# Patient Record
Sex: FEMALE | Race: WHITE | NOT HISPANIC OR LATINO | Employment: OTHER | URBAN - METROPOLITAN AREA
[De-identification: names, ages, dates, MRNs, and addresses within clinical notes are randomized per-mention and may not be internally consistent; named-entity substitution may affect disease eponyms.]

---

## 2017-01-01 ENCOUNTER — HOME CARE/HOSPICE - HEALTHEAST (OUTPATIENT)
Dept: HOSPICE | Facility: HOSPICE | Age: 72
End: 2017-01-01

## 2017-01-01 ENCOUNTER — HOME CARE/HOSPICE - HEALTHEAST (OUTPATIENT)
Dept: HOME HEALTH SERVICES | Facility: HOME HEALTH | Age: 72
End: 2017-01-01

## 2017-01-01 ENCOUNTER — ANESTHESIA - HEALTHEAST (OUTPATIENT)
Dept: SURGERY | Facility: HOSPITAL | Age: 72
End: 2017-01-01

## 2017-01-01 ENCOUNTER — COMMUNICATION - HEALTHEAST (OUTPATIENT)
Dept: INTERNAL MEDICINE | Facility: CLINIC | Age: 72
End: 2017-01-01

## 2017-01-01 ENCOUNTER — AMBULATORY - HEALTHEAST (OUTPATIENT)
Dept: HOSPICE | Facility: HOSPICE | Age: 72
End: 2017-01-01

## 2017-01-01 ENCOUNTER — RECORDS - HEALTHEAST (OUTPATIENT)
Dept: ADMINISTRATIVE | Facility: OTHER | Age: 72
End: 2017-01-01

## 2017-01-01 ENCOUNTER — AMBULATORY - HEALTHEAST (OUTPATIENT)
Dept: LAB | Facility: CLINIC | Age: 72
End: 2017-01-01

## 2017-01-01 ENCOUNTER — OFFICE VISIT - HEALTHEAST (OUTPATIENT)
Dept: VASCULAR SURGERY | Facility: CLINIC | Age: 72
End: 2017-01-01

## 2017-01-01 ENCOUNTER — COMMUNICATION - HEALTHEAST (OUTPATIENT)
Dept: CARE COORDINATION | Facility: CLINIC | Age: 72
End: 2017-01-01

## 2017-01-01 ENCOUNTER — COMMUNICATION - HEALTHEAST (OUTPATIENT)
Dept: PHYSICAL THERAPY | Age: 72
End: 2017-01-01

## 2017-01-01 ENCOUNTER — SURGERY - HEALTHEAST (OUTPATIENT)
Dept: SURGERY | Facility: HOSPITAL | Age: 72
End: 2017-01-01

## 2017-01-01 ENCOUNTER — AMBULATORY - HEALTHEAST (OUTPATIENT)
Dept: INTERNAL MEDICINE | Facility: CLINIC | Age: 72
End: 2017-01-01

## 2017-01-01 ENCOUNTER — COMMUNICATION - HEALTHEAST (OUTPATIENT)
Dept: VASCULAR SURGERY | Facility: CLINIC | Age: 72
End: 2017-01-01

## 2017-01-01 ENCOUNTER — OFFICE VISIT - HEALTHEAST (OUTPATIENT)
Dept: INTERNAL MEDICINE | Facility: CLINIC | Age: 72
End: 2017-01-01

## 2017-01-01 ENCOUNTER — OFFICE VISIT - HEALTHEAST (OUTPATIENT)
Dept: PODIATRY | Facility: CLINIC | Age: 72
End: 2017-01-01

## 2017-01-01 ENCOUNTER — COMMUNICATION - HEALTHEAST (OUTPATIENT)
Dept: SCHEDULING | Facility: CLINIC | Age: 72
End: 2017-01-01

## 2017-01-01 ENCOUNTER — COMMUNICATION - HEALTHEAST (OUTPATIENT)
Dept: HOME HEALTH SERVICES | Facility: HOME HEALTH | Age: 72
End: 2017-01-01

## 2017-01-01 ENCOUNTER — AMBULATORY - HEALTHEAST (OUTPATIENT)
Dept: CARE COORDINATION | Facility: CLINIC | Age: 72
End: 2017-01-01

## 2017-01-01 ENCOUNTER — AMBULATORY - HEALTHEAST (OUTPATIENT)
Dept: NURSING | Facility: CLINIC | Age: 72
End: 2017-01-01

## 2017-01-01 ENCOUNTER — RECORDS - HEALTHEAST (OUTPATIENT)
Dept: MAMMOGRAPHY | Facility: CLINIC | Age: 72
End: 2017-01-01

## 2017-01-01 ENCOUNTER — COMMUNICATION - HEALTHEAST (OUTPATIENT)
Dept: TELEHEALTH | Facility: CLINIC | Age: 72
End: 2017-01-01

## 2017-01-01 ENCOUNTER — AMBULATORY - HEALTHEAST (OUTPATIENT)
Dept: FAMILY MEDICINE | Facility: CLINIC | Age: 72
End: 2017-01-01

## 2017-01-01 ENCOUNTER — HOSPITAL ENCOUNTER (OUTPATIENT)
Dept: ULTRASOUND IMAGING | Facility: CLINIC | Age: 72
Discharge: HOME | End: 2017-06-16
Attending: INTERNAL MEDICINE

## 2017-01-01 ENCOUNTER — OFFICE VISIT - HEALTHEAST (OUTPATIENT)
Dept: GERIATRICS | Facility: CLINIC | Age: 72
End: 2017-01-01

## 2017-01-01 ENCOUNTER — OFFICE VISIT - HEALTHEAST (OUTPATIENT)
Dept: NURSING | Facility: CLINIC | Age: 72
End: 2017-01-01

## 2017-01-01 ENCOUNTER — AMBULATORY - HEALTHEAST (OUTPATIENT)
Dept: VASCULAR SURGERY | Facility: CLINIC | Age: 72
End: 2017-01-01

## 2017-01-01 DIAGNOSIS — I67.9 CEREBROVASCULAR DISEASE: ICD-10-CM

## 2017-01-01 DIAGNOSIS — L89.153 DECUBITUS ULCER OF COCCYX, STAGE III (H): ICD-10-CM

## 2017-01-01 DIAGNOSIS — I42.9 CARDIOMYOPATHY (H): ICD-10-CM

## 2017-01-01 DIAGNOSIS — I69.359 HISTORY OF HEMORRHAGIC STROKE WITH RESIDUAL HEMIPARESIS (H): ICD-10-CM

## 2017-01-01 DIAGNOSIS — Z79.899 POLYPHARMACY: ICD-10-CM

## 2017-01-01 DIAGNOSIS — E03.9 HYPOTHYROIDISM: ICD-10-CM

## 2017-01-01 DIAGNOSIS — C25.9 PANCREATIC ADENOCARCINOMA (H): ICD-10-CM

## 2017-01-01 DIAGNOSIS — R60.9 EDEMA: ICD-10-CM

## 2017-01-01 DIAGNOSIS — J31.0 CHRONIC RHINITIS: ICD-10-CM

## 2017-01-01 DIAGNOSIS — I51.9 HEART DISEASE, UNSPECIFIED: ICD-10-CM

## 2017-01-01 DIAGNOSIS — K21.9 GASTROESOPHAGEAL REFLUX DISEASE, ESOPHAGITIS PRESENCE NOT SPECIFIED: ICD-10-CM

## 2017-01-01 DIAGNOSIS — R29.6 FALLS FREQUENTLY: ICD-10-CM

## 2017-01-01 DIAGNOSIS — F90.9 ADHD (ATTENTION DEFICIT HYPERACTIVITY DISORDER): ICD-10-CM

## 2017-01-01 DIAGNOSIS — G90.522 COMPLEX REGIONAL PAIN SYNDROME TYPE 1 OF LEFT LOWER EXTREMITY: ICD-10-CM

## 2017-01-01 DIAGNOSIS — E43 SEVERE PROTEIN-CALORIE MALNUTRITION (H): ICD-10-CM

## 2017-01-01 DIAGNOSIS — N39.0 UTI (URINARY TRACT INFECTION): ICD-10-CM

## 2017-01-01 DIAGNOSIS — L97.509 FOOT ULCER (H): ICD-10-CM

## 2017-01-01 DIAGNOSIS — I42.8 NONISCHEMIC CARDIOMYOPATHY (H): ICD-10-CM

## 2017-01-01 DIAGNOSIS — R21 RASH: ICD-10-CM

## 2017-01-01 DIAGNOSIS — M06.09 RHEUMATOID ARTHRITIS OF MULTIPLE SITES WITH NEGATIVE RHEUMATOID FACTOR (H): ICD-10-CM

## 2017-01-01 DIAGNOSIS — G89.4 CHRONIC PAIN SYNDROME: ICD-10-CM

## 2017-01-01 DIAGNOSIS — R41.3 MEMORY LOSS: ICD-10-CM

## 2017-01-01 DIAGNOSIS — Z01.818 PRE-OPERATIVE EXAMINATION FOR INTERNAL MEDICINE: ICD-10-CM

## 2017-01-01 DIAGNOSIS — Z12.11 COLON CANCER SCREENING: ICD-10-CM

## 2017-01-01 DIAGNOSIS — Z88.9 MULTIPLE ALLERGIES: ICD-10-CM

## 2017-01-01 DIAGNOSIS — R29.6 RECURRENT FALLS WHILE WALKING: ICD-10-CM

## 2017-01-01 DIAGNOSIS — L89.152 DECUBITUS ULCER OF COCCYX, STAGE II (H): ICD-10-CM

## 2017-01-01 DIAGNOSIS — N32.81 OAB (OVERACTIVE BLADDER): ICD-10-CM

## 2017-01-01 DIAGNOSIS — E53.8 VITAMIN B 12 DEFICIENCY: ICD-10-CM

## 2017-01-01 DIAGNOSIS — F32.A ANXIETY AND DEPRESSION: ICD-10-CM

## 2017-01-01 DIAGNOSIS — K86.89 PANCREATIC DUCT DILATED: ICD-10-CM

## 2017-01-01 DIAGNOSIS — M32.9 SYSTEMIC LUPUS ERYTHEMATOSUS (H): ICD-10-CM

## 2017-01-01 DIAGNOSIS — K21.9 GERD (GASTROESOPHAGEAL REFLUX DISEASE): ICD-10-CM

## 2017-01-01 DIAGNOSIS — M20.41 HAMMER TOE OF RIGHT FOOT: ICD-10-CM

## 2017-01-01 DIAGNOSIS — F32.A DEPRESSION, UNSPECIFIED DEPRESSION TYPE: ICD-10-CM

## 2017-01-01 DIAGNOSIS — R35.0 FREQUENCY OF URINATION: ICD-10-CM

## 2017-01-01 DIAGNOSIS — M47.817 LUMBOSACRAL SPONDYLOSIS WITHOUT MYELOPATHY: ICD-10-CM

## 2017-01-01 DIAGNOSIS — G25.81 RESTLESS LEGS: ICD-10-CM

## 2017-01-01 DIAGNOSIS — R10.11 RUQ PAIN: ICD-10-CM

## 2017-01-01 DIAGNOSIS — F41.9 ANXIETY AND DEPRESSION: ICD-10-CM

## 2017-01-01 DIAGNOSIS — Z12.11 SCREENING FOR COLON CANCER: ICD-10-CM

## 2017-01-01 DIAGNOSIS — G25.81 RLS (RESTLESS LEGS SYNDROME): ICD-10-CM

## 2017-01-01 DIAGNOSIS — M20.10 HALLUX VALGUS, UNSPECIFIED LATERALITY: ICD-10-CM

## 2017-01-01 DIAGNOSIS — L89.90 DECUBITUS ULCER, UNSPECIFIED PRESSURE ULCER STAGE: ICD-10-CM

## 2017-01-01 DIAGNOSIS — L89.152 SACRAL DECUBITUS ULCER, STAGE II (H): ICD-10-CM

## 2017-01-01 DIAGNOSIS — R52 PAIN MANAGEMENT: ICD-10-CM

## 2017-01-01 DIAGNOSIS — C25.9 PANCREATIC CANCER (H): ICD-10-CM

## 2017-01-01 DIAGNOSIS — G89.29 OTHER CHRONIC PAIN: ICD-10-CM

## 2017-01-01 DIAGNOSIS — R29.898 WEAKNESS OF HAND: ICD-10-CM

## 2017-01-01 DIAGNOSIS — K59.00 CONSTIPATION: ICD-10-CM

## 2017-01-01 DIAGNOSIS — I10 ESSENTIAL HYPERTENSION WITH GOAL BLOOD PRESSURE LESS THAN 140/90: ICD-10-CM

## 2017-01-01 DIAGNOSIS — M81.0 OSTEOPOROSIS: ICD-10-CM

## 2017-01-01 DIAGNOSIS — Z12.31 ENCOUNTER FOR SCREENING MAMMOGRAM FOR MALIGNANT NEOPLASM OF BREAST: ICD-10-CM

## 2017-01-01 DIAGNOSIS — E43 EDEMA DUE TO MALNUTRITION (H): ICD-10-CM

## 2017-01-01 LAB
ATRIAL RATE - MUSE: 90 BPM
DIASTOLIC BLOOD PRESSURE - MUSE: NORMAL MMHG
INTERPRETATION ECG - MUSE: NORMAL
P AXIS - MUSE: 65 DEGREES
PR INTERVAL - MUSE: 126 MS
QRS DURATION - MUSE: 92 MS
QT - MUSE: 380 MS
QTC - MUSE: 464 MS
R AXIS - MUSE: 15 DEGREES
SYSTOLIC BLOOD PRESSURE - MUSE: NORMAL MMHG
T AXIS - MUSE: 58 DEGREES
VENTRICULAR RATE- MUSE: 90 BPM

## 2017-01-01 ASSESSMENT — MIFFLIN-ST. JEOR
SCORE: 771.24
SCORE: 800.73
SCORE: 809.8
SCORE: 823.41

## 2017-10-30 ENCOUNTER — AMBULATORY - HEALTHEAST (OUTPATIENT)
Dept: GERIATRICS | Facility: CLINIC | Age: 72
End: 2017-10-30

## 2021-05-24 ENCOUNTER — RECORDS - HEALTHEAST (OUTPATIENT)
Dept: ADMINISTRATIVE | Facility: CLINIC | Age: 76
End: 2021-05-24

## 2021-05-25 ENCOUNTER — RECORDS - HEALTHEAST (OUTPATIENT)
Dept: ADMINISTRATIVE | Facility: CLINIC | Age: 76
End: 2021-05-25

## 2021-05-26 ENCOUNTER — RECORDS - HEALTHEAST (OUTPATIENT)
Dept: ADMINISTRATIVE | Facility: CLINIC | Age: 76
End: 2021-05-26

## 2021-05-27 ENCOUNTER — RECORDS - HEALTHEAST (OUTPATIENT)
Dept: ADMINISTRATIVE | Facility: CLINIC | Age: 76
End: 2021-05-27

## 2021-05-28 ENCOUNTER — RECORDS - HEALTHEAST (OUTPATIENT)
Dept: ADMINISTRATIVE | Facility: CLINIC | Age: 76
End: 2021-05-28

## 2021-05-29 ENCOUNTER — RECORDS - HEALTHEAST (OUTPATIENT)
Dept: ADMINISTRATIVE | Facility: CLINIC | Age: 76
End: 2021-05-29

## 2021-05-30 VITALS — WEIGHT: 102 LBS | BODY MASS INDEX: 23.71 KG/M2

## 2021-05-30 VITALS — BODY MASS INDEX: 24.94 KG/M2 | WEIGHT: 107.3 LBS

## 2021-05-30 VITALS — BODY MASS INDEX: 23.61 KG/M2 | WEIGHT: 102 LBS | HEIGHT: 55 IN

## 2021-05-30 VITALS — BODY MASS INDEX: 24.03 KG/M2 | WEIGHT: 103.4 LBS

## 2021-05-30 VITALS — WEIGHT: 105 LBS | BODY MASS INDEX: 24.3 KG/M2 | HEIGHT: 55 IN

## 2021-05-31 VITALS — HEIGHT: 55 IN | BODY MASS INDEX: 23.14 KG/M2 | WEIGHT: 100 LBS

## 2021-05-31 VITALS — HEIGHT: 55 IN | BODY MASS INDEX: 22.45 KG/M2 | WEIGHT: 97 LBS

## 2021-05-31 VITALS — WEIGHT: 100 LBS | BODY MASS INDEX: 24.11 KG/M2

## 2021-05-31 VITALS — WEIGHT: 99.5 LBS | BODY MASS INDEX: 23.13 KG/M2

## 2021-05-31 VITALS — BODY MASS INDEX: 24.09 KG/M2 | WEIGHT: 99.9 LBS

## 2021-05-31 VITALS — BODY MASS INDEX: 22.78 KG/M2 | WEIGHT: 98 LBS

## 2021-05-31 VITALS — WEIGHT: 100.3 LBS | BODY MASS INDEX: 23.31 KG/M2

## 2021-05-31 VITALS — BODY MASS INDEX: 23.39 KG/M2 | WEIGHT: 97 LBS

## 2021-06-04 ENCOUNTER — RECORDS - HEALTHEAST (OUTPATIENT)
Dept: ADMINISTRATIVE | Facility: CLINIC | Age: 76
End: 2021-06-04

## 2021-06-08 NOTE — PROGRESS NOTES
Plastic Surgery Assessment Note    Today's Visit:  Pt is doing well and has no concerns or complaints.  Pt is tolerating the dressings well.  Pt has no fever or chills.      Labs:  No results for input(s): CRP, HGBA1C, LABPRE, ALBUMIN in the last 72 hours.    Invalid input(s): CDIFF, HEM    Vitals:  Vitals:    01/31/17 1435   BP: 118/58   Pulse: 80   Resp: 16   Temp: 98.6  F (37  C)       Francisco:           There is no height or weight on file to calculate BMI.    Output:                       Physical Exam:  General Appearance:  Appears comfortable, Alert, cooperative, no distress,   Head: Normocephalic, without obvious abnormality, atraumatic  Eyes: PERRL, conjunctiva/corneas clear, EOM's intact, both eyes   Nose: Nares normal, no drainage   Throat: Lips, mucosa, and tongue normal; teeth and gums normal  Neck: Supple, symmetrical, trachea midline, no adenopathy;    Lungs: Clear to auscultation bilaterally, respirations unlabored  Chest Wall: No tenderness or deformity  Heart: Regular rate and rhythm, S1 and S2 normal, no murmur, rubs or gallop  Abdomen: Soft, non-tender, bowel sounds active all four quadrants,   no masses, no organomegaly  Extremities: Extremities normal, atraumatic, no cyanosis or edema  Pulses: DP pulses are 1-2+ bilat.    Skin: no rashes or lesions  Neurologic: normal and equal strength bilat in upper and lower extremities    Wound Ostomy Assessment/Plan:  Wound 11/22/16 Coccyx (Active)   Pre Size Length 0.5 1/31/2017  2:00 PM   Pre Size Width 1 1/31/2017  2:00 PM   Pre Total Sq cm 0.5 1/31/2017  2:00 PM         There are stage 2-3 coccyx pressure ulcer. The right and left lateral edges of the buttock pressure ulcers have epithelized and healed. The coccyx still has some necrotic sq at the center of the base of the ulcer. They are very tender to palpation but there is no erythema, drainage or pus. The dressings were all removed.  New dresssings were reapplied.   I recommend sacral foam  dressings. This should continue to heal secondarily. I discussed with her the importance of offloading and good nutrition. Follow up prn.      Boogie Cedeño MD

## 2021-06-08 NOTE — PROGRESS NOTES
Plastic Surgery Assessment Note    Today's Visit:  Pt is doing well and has no concerns or complaints.  Pt is tolerating the dressings well.  Pt has no fever or chills.      Labs:  No results for input(s): CRP, HGBA1C, LABPRE, ALBUMIN in the last 72 hours.    Invalid input(s): CDIFF, HEM    Vitals:  Vitals:    01/03/17 1415   BP: 124/70   Pulse: 80   Resp: 16   Temp: 98.1  F (36.7  C)       Francisco:           There is no height or weight on file to calculate BMI.    Output:                       Physical Exam:  General Appearance:  Appears comfortable, Alert, cooperative, no distress,   Head: Normocephalic, without obvious abnormality, atraumatic  Eyes: PERRL, conjunctiva/corneas clear, EOM's intact, both eyes   Nose: Nares normal, no drainage   Throat: Lips, mucosa, and tongue normal; teeth and gums normal  Neck: Supple, symmetrical, trachea midline, no adenopathy;    Lungs: Clear to auscultation bilaterally, respirations unlabored  Chest Wall: No tenderness or deformity  Heart: Regular rate and rhythm, S1 and S2 normal, no murmur, rubs or gallop  Abdomen: Soft, non-tender, bowel sounds active all four quadrants,   no masses, no organomegaly  Extremities: Extremities normal, atraumatic, no cyanosis or edema  Pulses: DP pulses are 1-2+ bilat.    Skin: no rashes or lesions  Neurologic: normal and equal strength bilat in upper and lower extremities    Wound Ostomy Assessment/Plan:  Wound 11/22/16 Coccyx (Active)   Pre Size Length 0.5 1/3/2017  2:00 PM   Pre Size Width 0.5 1/3/2017  2:00 PM   Pre Total Sq cm 0.25 1/3/2017  2:00 PM       Incision 10/20/15 Arm Right (Active)     There are stage 2-3 coccyx pressure ulcer.  The right and left lateral edges of the buttock pressure ulcers have epithelized and healed. The coccyx still has some necrotic sq at the center of the base of the ulcer. They are very tender to palpation but there is no erythema, drainage or pus. The dressings were all removed. Using a currette,  incisional debridement of the coccyx ulcer was done. Debrided 0.5x0.5 cm of necrotic sq to healthy bleeding tissue. Hemostasis was well obtained. She tolerated it well and no complications were noted. Dresssings were reapplied.   I recommend sacral foam dressings. This should continue to heal secondarily. I discussed with her the importance of offloading and good nutrition. Follow up 1 month.  Boogie Cedeño MD

## 2021-06-09 NOTE — PROGRESS NOTES
Documentation only encounter to record results of depression screening.  Depression screening completed via [employer] (formerly, [employer]).  See flow sheet for screening.

## 2021-06-09 NOTE — PROGRESS NOTES
1. Frequency of urination  2. UTI (urinary tract infection)  Michelle Moya is a 71-year-old woman with very complicated medical history including severe osteoporosis resulting in multiple compression fractures and chronic pain related to this, who recently had urinary tract infection diagnosed at [facility] [place] [facility] and treated for strep pneumonia UTI with Augmentin (after Bactrim was discontinued with the culture result).  Repeat UA shows some pyuria, but is contaminated with squamous cells.  Await culture.  - Urinalysis-UC if Indicated  - Culture, Urine    3. Chronic rhinitis  Chronic runny nose, which is very embarrassing for her.  Sometimes she does not feel the mucus running from her nose.  At the last visit, we decided to restart the fluticasone, but apparently this has not been helpful.  Unclear whether or not this is allergic rhinitis.  I think her sore throat and feeling unwell may be related.  I would not rule out the possibility of a sinus infection earlier in the month given his temperature of 100.0, purulent sinus discharge, both of which have resolved.  - Ambulatory referral to Allergy    4. Weakness of hand  She notes that sometime in November she thinks she had a stroke.  She notices that her left hand has decreased sensation, but on exam it is weaker.  She has history of carpal tunnel syndrome, and unclear if this was a definite new change.  I would suspect it was.  She does not have a neurologist, and did not seek treatment because of her history of hemorrhagic stroke and being told that she could never get TPA.  She also noted some trouble with her memory, swallowing.  I think it is important for her to be evaluated by neurology.  - Ambulatory referral to Neurology    5. Constipation  She is on too many medications at this time, but when we went through each 1, either another doctor is prescribing this, or it seems to have some benefit for her.  Medications I would consider discontinuing would be  sucralfate, trazodone in the morning (this was removed), dicyclomine, hydroxychloroquine (she has diagnoses of rheumatoid arthritis and lupus, but negative rheumatoid factor, anti-CCP, and negative EMMA).  Question what usefulness this medication is having.  She no longer sees rheumatology.  Risk may outweigh benefit here.    6. Chronic Pain  No changes    7. Systemic lupus erythematosus  See problem #5    8. Rheumatoid arthritis of multiple sites with negative rheumatoid factor  See problem #5    9. Sacral decubitus ulcer, stage II  She still goes to wound clinic.  Stage II sacral ulcer present without signs of infection.  She has visiting nurse caring for this.

## 2021-06-09 NOTE — PROGRESS NOTES
Medication Therapy Management Initial Visit     ASSESSMENT AND PLAN  1. Multiple allergies  Was recently referred to allergy.    2. Rash  Resolved.     3. Gastroesophageal reflux disease, esophagitis presence not specified  Stable per patient report, only uses Bentyl once daily, this was updated in her medication list.     4. Depression, unspecified depression type  Stable per patient report, but she continues to feel that she would benefit from more Ritalin with regards to being able to accomplish tasks better.  She follows with Dr. Crain who continues to titrate her doses down.  I believe that she may benefit from adding therapy to her current mental health regimen, discussed that Ravi is here in clinic who may be able to help her work through some of her concerns.  I'm curious if there is underlying reason why she is against discontinuing any of her medications to help simplify her regimen.  -Recommend referral for therapy at [facility]    5. Heart Disease  Was most recently seen by Dr. Chaudhry in November 2016, due to history of nonischemic cardiomyopathy.  He attempted to discontinue her furosemide and potassium supplements, however I see that she is back on these and feels that she gains 23 pounds of water weight when these are discontinued.  Discussed with her nurse, who persists in her pillbox daily, and if she is going somewhere Michelle will take them out of her pillbox.  Provided education that she should take both potassium and loop diuretic together.  Labs were stable in December 2016.  Again these are additional medications that she is unwilling to part with, despite recommendations by cardiology.  Her blood pressure generally runs low, recommend one-month trial of discontinuing furosemide and potassium and assess for fluid concerns.  -Consider discussing trial of one month off of furosemide and potassium    6. Systemic lupus erythematosus  Per Dr. Lerma's assessment, she has a  previous diagnosis of rheumatoid arthritis and lupus however has several negative lab assessments which putting crushed in her current treatments.  She however is very resistant to discussing adjusting the use of hydroxychloroquine or dicyclomine.  She has been on hydroxychloroquine for many years, requires regular follow-up and with the eye doctor to assess safety of her medication therefore risk may outweigh benefit.  She is not willing to discuss these further today.    7. Lumbar Spondylosis  Follows with [facility] pain clinic, stable at this time, recommend to continue following up there as they have been monitoring her morphine use.    FOLLOW-UP PLAN  Michelle was advised to follow up with Dr. Lerma as previously directed.    The following instructions were given:   Patient Instructions   For now restart furosemide + potassium every other day until she is feeling better then resume at normal daily dosing     Find out if you should be on hydroxyzine only as needed for itching or if this is also used for anxiety     Refill Multivitamin at pharmacy - or purchase over the counter if not covered     A few medication changes were updated on list.     Call Mell with questions or concerns: [phone]       SUBJECTIVE AND OBJECTIVE  Michelle Moya is a 71 y.o. female who was referred by Eusebio Lerma DO for MT services.  Michelle's chief concern today is medication management. This visit was largely medication reconciliation as her home care nurse and PCP had brought up concerns for polypharmacy. There are several occasions throughout our visit where she does not know what her medications are yet we were able to figure them out with further questioning. She is somewhat attached to certain medications, making it difficult to simplify her regimen.       Allergies: seeing allergist.     Dermatology: rash that has resolved, prn benadryl cream.     Bowel regimen: dicyclomine helps keep stools soft to get them to go through  "her colostomy. Feels that her regimen of prune juice and miralax every other day.     Depression: Dr. Crain every 3 months. Used to do therapy, but her person retired. Consider Ravi? Feels scattered, having a hard time finishing things. Not sleeping well. Was in a study and started on ritalin. This had been very effective in helping her accomplish tasks. Dr. Crain has been decreasing her dose over time.    Cards: had a UTI and was urinating too much, now that this has resolved she is wondering how much furosemide she needs. If she doesn't take this she will \"blow up.\" she gained 23lbs in 3 weeks after this was discontinud per cardiology on 11/18/2016.     Rheumatology: taking hydroxychloroquine, pressures are on the fence this is watched every few months. She was diagnosed by Dr. Yoshi Chacon about 15-18 years ago. Has been on hydroxychloroquine since she will not be on prednisone, etc. Has been on hydroxychloroquine for over 5 years.      [facility] pain clinic: morphine pump plus BID morphine sulfate. For headaches uses APAP 3-4 times per week.    Adherence: Michelle misses 0 doses of medications per week. She does use a pill box at home, filled by her nurse.    This note has been dictated using voice recognition software. Any grammatical or context distortions are unintentional and inherent to the software.    Michelle was provided with follow up instructions, and this care plan was communicated via EMR with her primary care provider, Eusebio Lerma DO, and is the authorizing prescriber for this visit.  Direct supervision was available by either the patient's PCP or another available physician when needed.    Time spent: 45 minutes  Level of service: 3    Leo Youssef, PharmD, BCACP  Medication Management (MTM) Pharmacist  [facility]    We reviewed Michelle's medication list with them, discussing reason for use, directions for use, and potential side effects of each medication.  Indication, safety, " efficacy, and convenience was assessed for all reviewed medications.  No environmental factors were noted currently affecting patient.    Current Outpatient Prescriptions   Medication Sig Dispense Refill     acetaminophen (TYLENOL) 500 MG tablet Take 500-1,000 mg by mouth as needed. For headaches        albuterol (PROVENTIL HFA;VENTOLIN HFA) 90 mcg/actuation inhaler Inhale 2 puffs every 4 (four) hours as needed for wheezing.       aspirin 325 MG tablet Take 325 mg by mouth daily.       atorvastatin (LIPITOR) 10 MG tablet 1 TAB BY MOUTH DAILY (CHOLESTEROL) 30 tablet 11     calcium citrate (CALCITRATE) 200 mg (950 mg) tablet Take 2.5 tablets (500 mg total) by mouth 3 (three) times a day. 675 tablet 1     carvedilol (COREG) 12.5 MG tablet Take 1 tablet (12.5 mg total) by mouth 2 (two) times a day with meals. 180 tablet 4     cetirizine (ZYRTEC) 10 MG tablet Take 10 mg by mouth every morning.       cholecalciferol, vitamin D3, 1,000 unit tablet Take 3,000 Units by mouth daily.       clonazePAM (KLONOPIN) 0.5 MG tablet Take 0.25 mg by mouth 2 (two) times a day. Every morning and lunch        clonazePAM (KLONOPIN) 0.5 MG tablet Take 0.5 mg by mouth bedtime.        conjugated estrogens (PREMARIN) vaginal cream Apply pea size amount on finger and apply to vaginal area 1-2x week. 42.5 g 11     cyanocobalamin (VITAMIN B-12) 1000 MCG tablet Take 1,000 mcg by mouth daily.       cycloSPORINE (RESTASIS) 0.05 % ophthalmic emulsion Administer 1 drop to both eyes 2 (two) times a day.       dicyclomine (BENTYL) 10 MG capsule Take 1 capsule (10 mg total) by mouth every morning. And two other doses as needed daily 90 capsule 3     EPINEPHrine (EPIPEN) 0.3 mg/0.3 mL (1:1,000) atIn Inject 0.3 mg into the shoulder, thigh, or buttocks once.       estradiol (ESTRACE) 0.01 % (0.1 mg/gram) vaginal cream Place pea size amount to finger and apply to vaginal area 1-2x per week 42.5 g 0     ferrous sulfate 325 mg (65 mg iron) CpER Take 1 tablet  "by mouth every morning.        FETZIMA 120 mg Cs24 ER capsule 1 CAPSULE BY MOUTH DAILY (ANTI-DEPRESSANT) 30 capsule 11     fluticasone (FLONASE) 50 mcg/actuation nasal spray 2 sprays into each nostril daily. 16 g 1     foam bandage 3 X 3 \" Bndg Apply 1 application topically daily. 30 each 1     furosemide (LASIX) 20 MG tablet Take 1 tablet (20 mg total) by mouth daily. 90 tablet 3     hydrocolloid dressing (DUODERM CGF DRESSING) 4 X 4 \" Bndg Apply 1 patch topically 2 (two) times a week. 10 each 1     hydroxychloroquine (PLAQUENIL) 200 mg tablet TAKE 1 TABLET (200 MG TOTAL) BY MOUTH DAILY. (LUPUS) 30 tablet 10     hydrOXYzine (ATARAX) 25 MG tablet Take 25 mg by mouth 3 (three) times a day as needed for itching.       levothyroxine (SYNTHROID, LEVOTHROID) 75 MCG tablet 1 TAB BY MOUTH DAILY (DX:UNDERACTIVE THYROID) 30 tablet 10     lipase-protease-amylase (CREON) 12,000-38,000 -60,000 unit CpDR capsule Take 12,000 units of lipase by mouth 3 (three) times a day with meals.       lisinopril (PRINIVIL,ZESTRIL) 10 MG tablet Take 1 tablet (10 mg total) by mouth daily. 90 tablet 4     methylphenidate (RITALIN) 5 MG tablet Take 15 mg by mouth every morning. And 10mg at noon       morphine (MS CONTIN) 15 MG 12 hr tablet Take 1 tablet (15 mg total) by mouth every 12 (twelve) hours. (Patient taking differently: Take 15 mg by mouth every 6 (six) hours. ) 720 tablet 0     mupirocin (BACTROBAN) 2 % ointment Apply topically 3 (three) times a day.       nitroglycerin (NITROSTAT) 0.4 MG SL tablet Place 0.4 mg under the tongue every 5 (five) minutes as needed for chest pain.       omeprazole (PRILOSEC) 20 MG capsule 1 CAP BY MOUTH TWICE DAILY - TAKE 30 MINUTES BEFORE A MEAL - DO NOT CRUSH (DX: GERD) 60 capsule 11     ondansetron (ZOFRAN) 4 MG tablet Take 4 mg by mouth every 8 (eight) hours as needed for nausea.       polyethylene glycol (MIRALAX) 17 gram packet Take 1 packet (17 g total) by mouth every other day. 17 GM in 8 oz of water " daily as needed 30 each 11     polyvinyl alcohol (ARTIFICIAL TEARS, POLYVIN ALC,) 1.4 % ophthalmic solution as needed.        potassium chloride SA (K-DUR,KLOR-CON) 20 MEQ tablet Take 1 tablet (20 mEq total) by mouth daily. 90 tablet 3     rOPINIRole (REQUIP) 0.25 MG tablet 1 TAB PO BID - IN MORNING AND BEDTIME (DX: RESTLESS LEGS) 60 tablet 11     senna (SENOKOT) 8.6 mg tablet Take 1 tablet by mouth 2 (two) times a day as needed for constipation. 1 tablet BID as needed for constipation       simethicone (GAS-X) 80 MG chewable tablet Chew 80 mg 3 (three) times a day.        sodium chloride (OCEAN) 0.65 % nasal spray 1 spray into each nostril as needed for congestion. Install 2 sprays into both nostrils every 4 hours as needed for congestion       sucralfate (CARAFATE) 1 gram tablet 1 TAB BY MOUTH FOUR TIMES DAILY (DX:REFLUX DISEASE) 120 tablet 10     TAB-A-HU per tablet 1 TAB BY MOUTH DAILY (DX: SUPPLEMENT) 100 tablet 3     topiramate (TOPAMAX) 100 MG tablet 1 TAB BY MOUTH TWICE DAILY (DX:MOOD DISORDER) 60 tablet 10     traZODone (DESYREL) 150 MG tablet Take 200 mg by mouth bedtime. Per Dr. Crain        triamcinolone (KENALOG) 0.1 % cream Apply topically 3 (three) times a day as needed. 80 g 1     trospium (SANCTURA) 20 mg tablet Take 20 mg by mouth 2 (two) times a day.       vitamin E 400 UNIT capsule Take 400 Units by mouth daily.       Current Facility-Administered Medications   Medication Dose Route Frequency Provider Last Rate Last Dose     denosumab 60 mg (PROLIA 60 mg/ml)  60 mg Subcutaneous Q6 Months Eusebio Lerma DO   60 mg at 02/15/17 1420     lidocaine 2 % jelly (XYLOCAINE)   Topical PRN Boogie Cedeño MD   1 application at 01/31/17 1440

## 2021-06-09 NOTE — PROGRESS NOTES
ASSESSMENT:  1. Frequency of urination  2. UTI (urinary tract infection)  Michelle Moya is a 71-year-old woman with very complicated medical history including severe osteoporosis resulting in multiple compression fractures and chronic pain related to this, who recently had urinary tract infection diagnosed at [facility] [place] [facility] and treated for strep pneumonia UTI with Augmentin (after Bactrim was discontinued with the culture result).  Repeat UA shows some pyuria, but is contaminated with squamous cells.  Await culture.  - Urinalysis-UC if Indicated  - Culture, Urine    3. Chronic rhinitis  Chronic runny nose, which is very embarrassing for her.  Sometimes she does not feel the mucus running from her nose.  At the last visit, we decided to restart the fluticasone, but apparently this has not been helpful.  Unclear whether or not this is allergic rhinitis.  I think her sore throat and feeling unwell may be related.  I would not rule out the possibility of a sinus infection earlier in the month given his temperature of 100.0, purulent sinus discharge, both of which have resolved.  - Ambulatory referral to Allergy    4. Weakness of hand  She notes that sometime in November she thinks she had a stroke.  She notices that her left hand has decreased sensation, but on exam it is weaker.  She has history of carpal tunnel syndrome, and unclear if this was a definite new change.  I would suspect it was.  She does not have a neurologist, and did not seek treatment because of her history of hemorrhagic stroke and being told that she could never get TPA.  She also noted some trouble with her memory, swallowing.  I think it is important for her to be evaluated by neurology.  - Ambulatory referral to Neurology    5. Constipation  She is on too many medications at this time, but when we went through each 1, either another doctor is prescribing this, or it seems to have some benefit for her.  Medications I would consider  discontinuing would be sucralfate, trazodone in the morning (this was removed), dicyclomine, hydroxychloroquine (she has diagnoses of rheumatoid arthritis and lupus, but negative rheumatoid factor, anti-CCP, and negative EMMA).  Question what usefulness this medication is having.  She no longer sees rheumatology.  Risk may outweigh benefit here.    6. Chronic Pain  No changes    7. Systemic lupus erythematosus  See problem #5    8. Rheumatoid arthritis of multiple sites with negative rheumatoid factor  See problem #5    9. Sacral decubitus ulcer, stage II  She still goes to wound clinic.  Stage II sacral ulcer present without signs of infection.  She has visiting nurse caring for this.         PLAN:  Patient Instructions   Referral to allergist for sinus issues and runny nose    Neurology referral       Orders Placed This Encounter   Procedures     Culture, Urine     Ambulatory referral to Allergy     Referral Priority:   Routine     Referral Type:   Consultation     Referral Reason:   Evaluation and Treatment     Referred to Provider:   Adonis Fiedls MD     Requested Specialty:   Allergy     Number of Visits Requested:   1     Ambulatory referral to Neurology     Referral Priority:   Routine     Referral Type:   Consultation     Referral Reason:   Evaluation and Treatment     Referred to Provider:   Sumanth Huffman MD     Requested Specialty:   Neurology     Number of Visits Requested:   1     Medications Discontinued During This Encounter   Medication Reason     nystatin (MYCOSTATIN) ointment      traZODone (DESYREL) 50 MG tablet      polyethylene glycol (MIRALAX) 17 gram packet Reorder       Return in about 1 month (around 3/20/2017).    CHIEF COMPLAINT:  Chief Complaint   Patient presents with     Urinary Tract Infection     Illness     Sick for 4 weeks       HISTORY OF PRESENT ILLNESS:  Michelle is a 71 y.o. female presenting to the clinic today for problems with multiple problems. She is currently being  treated for UTI but she is most bothered by symptoms of sore throat and a chest cold which is persisting for the past 5 weeks. The sore throat has been present longer than the chest cold. She has also had fevers which are now resolved. Her nasal mucus was yellow but now is clear and persistently draining.     History of Stroke: She feels she had some TIAs in the fall of last year. She did not go to the hospital because she doesn't feel anything can be done in the hospital. She has had two strokes in the past. Her symptoms included drooling and nasal dripping that she does not feel. She does not wish to leave the house due to the nasal drainage which she finds embarrassing. She is unable to hold on to things with her left hand. She doesn't feel the items and drops them. She also finds it difficult to lift her arms above her head in order to fix her hair. Her PCAs have gotten together and compiled a list of changes in her they have noticed since last fall. Unfortunately, she forgot to bring the list. Her speech is affected and she feels mental confusion. She reports numbness in her right arm and lower legs bilaterally. She is not currently seeing a neurologist. Her former neurologist is no longer covered by her insurance.     Urinary Issues: She is treated by Dr. Santos Ramirez at [facility] for problems of incontinence. She is currently taking Augmentin for UTI and possible kidney infection. She was having some vaginal bleeding but this is resolved. She was receiving acupuncture for her urinary problems which she felt was working well until her current UTI.     Constipation: She is taking Miralax every other day with prunes on the day in between. This regimen is working for her.     Hip Pain: She has been told she cannot have a hip replacement surgery due to her heart.     Sleep Issues: She reports poor sleeping patterns lately. She states that she cannot sleep for more than 2 hours at a time. She takes trazodone  at night and again in the morning, though it makes her tired.    REVIEW OF SYSTEMS:   She denies depression though she is very frustrated by the ongoing ambiguity of her living situation. She does not know if she will be able to stay in her apartment with the aid of a PCA or if she will be forced to move to a nursing home.   She is having difficulty with swallowing. She is making sure to chew her food thoroughly and wash it down with water if she is having trouble.    She requests an all over treatment for dry skin.   She would like to discontinue morphine.   She reports sores on her bottom being treated with sacral foam dressings. [facility].   All other systems are negative.    PFSH:  She does not wish to go to a nursing home and does not feel that she needs this yet.     TOBACCO USE:  History   Smoking Status     Never Smoker   Smokeless Tobacco     Not on file       VITALS:  Vitals:    02/20/17 1156   BP: 108/56   Pulse: 80   Temp: 98.3  F (36.8  C)   Weight: 107 lb 4.8 oz (48.7 kg)     Wt Readings from Last 3 Encounters:   02/20/17 107 lb 4.8 oz (48.7 kg)   12/12/16 113 lb 6.4 oz (51.4 kg)   11/22/16 114 lb (51.7 kg)     Body mass index is 24.94 kg/(m^2).    PHYSICAL EXAM:  General: Alert, talkative soft-spoken woman seen in a motorized wheelchair.   HEENT: Enlarged left submandibular gland.   Ears: Decreased hearing to finger rubbing. She notes she is not wearing her hearing aids.   Lungs: Clear. Respiratory effort normal.   Skin: Stage 2 ulcer in the gluteal cleft, presacral. No surrounding erythema. No discharge.   Neuro: Decreased sensation left cheek. Decreased sensation lower legs.     ADDITIONAL HISTORY SUMMARIZED (2): reviewed OP rehab note from [facility] 2/14/17. No mention from ILS worker about concerns.  DECISION TO OBTAIN EXTRA INFORMATION (1): SARKIS for Care Everywhere obtained.   RADIOLOGY TESTS (1): None.  LABS (1): Labs ordered and reviewed.   MEDICINE TESTS (1):  None.  INDEPENDENT REVIEW (2 each): None.       The visit lasted a total of 27 minutes face to face with the patient. Over 50% of the time was spent counseling and educating the patient about neurologic symptoms.    ILeo, am scribing for and in the presence of, Dr. Lerma.    I, Dr. Lerma, personally performed the services described in this documentation, as scribed by Leo Torrez in my presence, and it is both accurate and complete.    MEDICATIONS:  Current Outpatient Prescriptions   Medication Sig Dispense Refill     acetaminophen (TYLENOL) 500 MG tablet Take 1,000 mg by mouth as needed (headaches). For headaches.       albuterol (PROVENTIL HFA;VENTOLIN HFA) 90 mcg/actuation inhaler Inhale 2 puffs every 4 (four) hours as needed for wheezing.       aspirin 325 MG tablet Take 325 mg by mouth daily.       atorvastatin (LIPITOR) 10 MG tablet 1 TAB BY MOUTH DAILY (CHOLESTEROL) 30 tablet 11     calcium citrate (CALCITRATE) 200 mg (950 mg) tablet Take 2.5 tablets (500 mg total) by mouth 3 (three) times a day. 675 tablet 1     carvedilol (COREG) 12.5 MG tablet Take 1 tablet (12.5 mg total) by mouth 2 (two) times a day with meals. 180 tablet 4     cetirizine (ZYRTEC) 10 MG tablet Take 10 mg by mouth every morning.       cholecalciferol, vitamin D3, 1,000 unit tablet Take 3,000 Units by mouth daily.       clonazePAM (KLONOPIN) 0.5 MG tablet Take 0.25 mg by mouth 2 (two) times a day. Every morning and lunch       clonazePAM (KLONOPIN) 0.5 MG tablet Take 0.5 mg by mouth bedtime.        cyanocobalamin (VITAMIN B-12) 1000 MCG tablet Take 1,000 mcg by mouth daily.       cycloSPORINE (RESTASIS) 0.05 % ophthalmic emulsion Administer 1 drop to both eyes 2 (two) times a day.       denosumab (PROLIA) 60 mg/mL Syrg Inject 1 mL (60 mg total) under the skin every 6 (six) months. 1 mL 0     desoximetasone (TOPICORT) 0.25 % cream Apply topically 2 (two) times a day. As needed       dicyclomine (BENTYL) 10 MG capsule 1 CAP BY  "MOUTH THREE TIMES DAILY AS NEEDED 90 capsule 3     diphenhydrAMINE (BENADRYL) 2 % cream Apply 1 application topically 3 (three) times a day as needed for itching.       EPINEPHrine (EPIPEN) 0.3 mg/0.3 mL (1:1,000) atIn Inject 0.3 mg into the shoulder, thigh, or buttocks once.       estradiol (ESTRACE) 0.01 % (0.1 mg/gram) vaginal cream Place pea size amount to finger and apply to vaginal area 1-2x per week 42.5 g 0     ferrous sulfate 325 mg (65 mg iron) CpER Take 1 tablet by mouth every morning.        FETZIMA 120 mg Cs24 ER capsule 1 CAPSULE BY MOUTH DAILY (ANTI-DEPRESSANT) 30 capsule 11     fluticasone (FLONASE) 50 mcg/actuation nasal spray 2 sprays into each nostril daily. 16 g 1     foam bandage 3 X 3 \" Bndg Apply 1 application topically daily. 30 each 1     furosemide (LASIX) 20 MG tablet Take 1 tablet (20 mg total) by mouth daily. 90 tablet 3     hydrocolloid dressing (DUODERM CGF DRESSING) 4 X 4 \" Bndg Apply 1 patch topically 2 (two) times a week. 10 each 1     hydroxychloroquine (PLAQUENIL) 200 mg tablet TAKE 1 TABLET (200 MG TOTAL) BY MOUTH DAILY. (LUPUS) 30 tablet 10     hydrOXYzine (ATARAX) 25 MG tablet Take 25 mg by mouth 3 (three) times a day as needed for itching.       levothyroxine (SYNTHROID, LEVOTHROID) 75 MCG tablet 1 TAB BY MOUTH DAILY (DX:UNDERACTIVE THYROID) 30 tablet 10     lipase-protease-amylase (CREON) 12,000-38,000 -60,000 unit CpDR capsule Take 12,000 units of lipase by mouth 3 (three) times a day with meals.       lisinopril (PRINIVIL,ZESTRIL) 10 MG tablet Take 1 tablet (10 mg total) by mouth daily. 90 tablet 4     methylphenidate (RITALIN) 5 MG tablet Take 15 mg by mouth every morning. And 10mg at noon       morphine (MS CONTIN) 15 MG 12 hr tablet Take 1 tablet (15 mg total) by mouth every 12 (twelve) hours. (Patient taking differently: Take 15 mg by mouth every 6 (six) hours. ) 720 tablet 0     multivitamin therapeutic (THERAGRAN) tablet Take 1 tablet by mouth daily.       mupirocin " (BACTROBAN) 2 % ointment Apply topically 3 (three) times a day.       nitroglycerin (NITROSTAT) 0.4 MG SL tablet Place 0.4 mg under the tongue every 5 (five) minutes as needed for chest pain.       omeprazole (PRILOSEC) 20 MG capsule 1 CAP BY MOUTH TWICE DAILY - TAKE 30 MINUTES BEFORE A MEAL - DO NOT CRUSH (DX: GERD) 60 capsule 11     ondansetron (ZOFRAN) 4 MG tablet Take 4 mg by mouth every 8 (eight) hours as needed for nausea.       polyethylene glycol (MIRALAX) 17 gram packet Take 1 packet (17 g total) by mouth every other day. 17 GM in 8 oz of water daily as needed 30 each 11     polyvinyl alcohol (ARTIFICIAL TEARS, POLYVIN ALC,) 1.4 % ophthalmic solution as needed.        potassium chloride SA (K-DUR,KLOR-CON) 20 MEQ tablet Take 1 tablet (20 mEq total) by mouth daily. 90 tablet 3     rOPINIRole (REQUIP) 0.25 MG tablet 1 TAB PO BID - IN MORNING AND BEDTIME (DX: RESTLESS LEGS) 60 tablet 11     senna (SENOKOT) 8.6 mg tablet Take 1 tablet by mouth 2 (two) times a day as needed for constipation. 1 tablet BID as needed for constipation       simethicone (GAS-X) 80 MG chewable tablet Chew 80 mg 3 (three) times a day.        sodium chloride (OCEAN) 0.65 % nasal spray 1 spray into each nostril as needed for congestion. Install 2 sprays into both nostrils every 4 hours as needed for congestion       sucralfate (CARAFATE) 1 gram tablet 1 TAB BY MOUTH FOUR TIMES DAILY (DX:REFLUX DISEASE) 120 tablet 10     TAB-A-HU per tablet 1 TAB BY MOUTH DAILY (DX: SUPPLEMENT) 100 tablet 3     topiramate (TOPAMAX) 100 MG tablet 1 TAB BY MOUTH TWICE DAILY (DX:MOOD DISORDER) 60 tablet 10     traMADol (ULTRAM) 50 mg tablet Take 50 mg by mouth every 6 (six) hours as needed for pain.       traZODone (DESYREL) 150 MG tablet Take 150 mg by mouth bedtime.       triamcinolone (KENALOG) 0.1 % cream Apply topically 3 (three) times a day as needed. 80 g 1     trospium (SANCTURA) 20 mg tablet Take 20 mg by mouth 2 (two) times a day.       vitamin E  400 UNIT capsule Take 400 Units by mouth daily.       Current Facility-Administered Medications   Medication Dose Route Frequency Provider Last Rate Last Dose     denosumab 60 mg (PROLIA 60 mg/ml)  60 mg Subcutaneous Q6 Months Eusebio Lerma DO   60 mg at 02/15/17 1420     lidocaine 2 % jelly (XYLOCAINE)   Topical PRN Boogie Cedeño MD   1 application at 01/31/17 1440       Total data points: 4

## 2021-06-09 NOTE — PROGRESS NOTES
ASSESSMENT:  1. Cardiomyopathy  Michelle Moya is a 71-year-old woman with history of cardiomyopathy, best described as diastolic heart failure, rheumatoid arthritis, chronic pain secondary to osteoporosis, etc., who presents for follow-up.  Her weight had increased 116 pounds a couple months ago, has increased the Lasix to twice a day, which helped bring her weight down to the usual range of 103-106.  We will reduce Lasix to once a day, with instruction to take extra afternoon dose if weight is above 106.  She has trace edema on exam, clear lungs, well compensated at this time.  - furosemide (LASIX) 20 MG tablet; Take 20 mg by mouth daily, and take second afternoon dose if weight is over 106 lbs.  Dispense: 90 tablet; Refill: 3  - Basic Metabolic Panel    2. Restless legs  She has frequent leg jerking, and I talked about discontinuing ropinirole because it is such a low dose and may not be helpful.  Polypharmacy is a big problem, but medication changes are met with some resistance when we talk about them, such as ropinirole.  We will trial an increased dose of ropinirole to see if it helps, if it is not, titrate off.  She has multiple medications with overlapping functions.  I thought that may be since she is on MS Contin, the morphine would help with restless legs.  The same goes for clonazepam which she takes at night.  - rOPINIRole (REQUIP) 0.5 MG tablet; Take 1 tablet (0.5 mg total) by mouth 2 (two) times a day.  Dispense: 180 tablet; Refill: 3        PLAN:  Patient Instructions   STOP sucralfate    Lasix daily (instead of twice a day)-- take a second afternoon dose if the morning weight is above 106 lbs    INCREASE ropinirole to 0.5 mg twice a day       Orders Placed This Encounter   Procedures     Basic Metabolic Panel     Medications Discontinued During This Encounter   Medication Reason     sucralfate (CARAFATE) 1 gram tablet      furosemide (LASIX) 20 MG tablet Reorder     morphine (MS CONTIN) 15 MG 12 hr  tablet Reorder     rOPINIRole (REQUIP) 0.25 MG tablet Reorder       Return in about 1 month (around 5/5/2017).    CHIEF COMPLAINT:  No chief complaint on file.      HISTORY OF PRESENT ILLNESS:  Michelle is a 71 y.o. female presenting to the clinic today to follow up regarding her edema and shortness of breath.    Edema: Last week, she was experiencing bilateral edema with shortness of breath. She had not been taking Lasix regularly. She was advised to increase her Lasix to twice daily, and this has helped to decrease her swelling over the last few days. Her breathing has also improved. She reports that this morning, her weight was 102 pounds. Her weight had been up to 106 pounds last week. Her weight did increase to 118 a few months ago. She weighs herself regularly. She does complain of frequent urination with taking Lasix twice daily. Her echocardiogram from 11/4/16 was normal.     Left sided weakness: She saw her Dr. Huffman on 3/9/17, who had advised in- home therapy. She has been dropping objects recently. She was also advised to have a follow up CT to evaluate for possible second stroke. She has not had this done yet due to insurance coverage. She complains of recent brain fog and memory concerns, and attributes this to her potential recent stroke.  She has a physical therapist from [facility], but has a home nurse through [facility].     Restless legs: She has been taking ropinirole for restless legs. Her PCA has noted that she continues to have muscle spasms of the legs in her sleep. She also notes that muscle spasms often occur during the day.     Dysphagia: She continues to have difficulty swallowing, and this is becoming progressively worse. She is concerned that her lymph nodes are enlarged. She has not met with an allergist yet due to insurance coverage.     Health maintenance: She has not had a colonoscopy.     REVIEW OF SYSTEMS:   All other systems are negative.    PFS:  She will be getting a new home nurse  "soon.     TOBACCO USE:  History   Smoking Status     Never Smoker   Smokeless Tobacco     Not on file       VITALS:  Vitals:    04/05/17 1400   BP: 108/62   Pulse: 88   Resp: 10   Weight: 102 lb (46.3 kg)   Height: 4' 7\" (1.397 m)     Wt Readings from Last 3 Encounters:   04/05/17 102 lb (46.3 kg)   02/20/17 107 lb 4.8 oz (48.7 kg)   12/12/16 113 lb 6.4 oz (51.4 kg)     Body mass index is 23.71 kg/(m^2).    PHYSICAL EXAM:  General: Alert, no distress  Extremities: Mild pitting edema bilaterally.   Heart: Regular rate and rhythm, no murmur.   Lungs: Clear to auscultation.   Neuro: Decreased sensation on the left temporal and left mandibular areas to light tough.     ADDITIONAL HISTORY SUMMARIZED (2): Reviewed Dr. Huffman' note from 3/11/17 regarding left sided weakness and possible stroke.   DECISION TO OBTAIN EXTRA INFORMATION (1): None.   RADIOLOGY TESTS (1): None.  LABS (1): Ordered labs today.   MEDICINE TESTS (1): Reviewed last echocardiogram from 11/4/16.   INDEPENDENT REVIEW (2 each): None.     The visit lasted a total of 21 minutes face to face with the patient. Over 50% of the time was spent counseling and educating the patient about medications.    IKiah, am scribing for and in the presence of, Dr. Lerma.    IDr. Lerma, personally performed the services described in this documentation, as scribed by Kiah Ervin in my presence, and it is both accurate and complete.    MEDICATIONS:  Current Outpatient Prescriptions   Medication Sig Dispense Refill     acetaminophen (TYLENOL) 500 MG tablet Take 500-1,000 mg by mouth as needed. For headaches        albuterol (PROVENTIL HFA;VENTOLIN HFA) 90 mcg/actuation inhaler Inhale 2 puffs every 4 (four) hours as needed for wheezing.       aspirin 325 MG tablet Take 325 mg by mouth daily.       atorvastatin (LIPITOR) 10 MG tablet 1 TAB BY MOUTH DAILY (CHOLESTEROL) 30 tablet 11     calcium citrate (CALCITRATE) 200 mg (950 mg) tablet Take 2.5 tablets " "(500 mg total) by mouth 3 (three) times a day. 675 tablet 1     carvedilol (COREG) 12.5 MG tablet Take 1 tablet (12.5 mg total) by mouth 2 (two) times a day with meals. 180 tablet 4     cetirizine (ZYRTEC) 10 MG tablet Take 10 mg by mouth every morning.       cholecalciferol, vitamin D3, 1,000 unit tablet Take 3 tablets (3,000 Units total) by mouth daily. 270 tablet 3     clonazePAM (KLONOPIN) 0.5 MG tablet Take 0.25 mg by mouth 2 (two) times a day. Every morning and lunch        clonazePAM (KLONOPIN) 0.5 MG tablet Take 0.5 mg by mouth bedtime.        conjugated estrogens (PREMARIN) vaginal cream Apply pea size amount on finger and apply to vaginal area 1-2x week. 42.5 g 11     cyanocobalamin (VITAMIN B-12) 1000 MCG tablet Take 1,000 mcg by mouth daily.       cycloSPORINE (RESTASIS) 0.05 % ophthalmic emulsion Administer 1 drop to both eyes 2 (two) times a day.       dicyclomine (BENTYL) 10 MG capsule Take 1 capsule (10 mg total) by mouth every morning. And two other doses as needed daily 90 capsule 3     EPINEPHrine (EPIPEN) 0.3 mg/0.3 mL (1:1,000) atIn Inject 0.3 mg into the shoulder, thigh, or buttocks once.       estradiol (ESTRACE) 0.01 % (0.1 mg/gram) vaginal cream Place pea size amount to finger and apply to vaginal area 1-2x per week 42.5 g 0     ferrous sulfate 325 mg (65 mg iron) CpER Take 1 tablet by mouth every morning. 30 capsule 5     fluticasone (FLONASE) 50 mcg/actuation nasal spray 2 sprays into each nostril daily. 16 g 1     foam bandage 3 X 3 \" Bndg Apply 1 application topically daily. 30 each 1     furosemide (LASIX) 20 MG tablet Take 20 mg by mouth daily, and take second afternoon dose if weight is over 106 lbs. 90 tablet 3     hydrocolloid dressing (DUODERM CGF DRESSING) 4 X 4 \" Bndg Apply 1 patch topically 2 (two) times a week. 10 each 1     hydroxychloroquine (PLAQUENIL) 200 mg tablet TAKE 1 TABLET (200 MG TOTAL) BY MOUTH DAILY. (LUPUS) 30 tablet 10     hydrOXYzine (ATARAX) 25 MG tablet Take " 25 mg by mouth 3 (three) times a day as needed for itching.       levomilnacipran (FETZIMA) 120 mg Cs24 ER capsule Take 1 capsule (120 mg total) by mouth daily. 90 capsule 3     levothyroxine (SYNTHROID, LEVOTHROID) 75 MCG tablet Take 1 tablet (75 mcg total) by mouth Daily at 6:00 am. 90 tablet 3     lipase-protease-amylase (CREON) 12,000-38,000 -60,000 unit CpDR capsule Take 12,000 units of lipase by mouth 3 (three) times a day with meals.       lisinopril (PRINIVIL,ZESTRIL) 10 MG tablet Take 1 tablet (10 mg total) by mouth daily. 90 tablet 4     methylphenidate (RITALIN) 5 MG tablet Take 15 mg by mouth every morning. And 10mg at noon       morphine (MS CONTIN) 15 MG 12 hr tablet Take 1 tablet (15 mg total) by mouth every 12 (twelve) hours. 720 tablet 0     mupirocin (BACTROBAN) 2 % ointment Apply topically 3 (three) times a day.       nitroglycerin (NITROSTAT) 0.4 MG SL tablet Place 0.4 mg under the tongue every 5 (five) minutes as needed for chest pain.       omeprazole (PRILOSEC) 20 MG capsule 1 CAP BY MOUTH TWICE DAILY - TAKE 30 MINUTES BEFORE A MEAL - DO NOT CRUSH (DX: GERD) 60 capsule 11     ondansetron (ZOFRAN) 4 MG tablet Take 4 mg by mouth every 8 (eight) hours as needed for nausea.       polyethylene glycol (MIRALAX) 17 gram packet Take 1 packet (17 g total) by mouth every other day. 17 GM in 8 oz of water daily as needed 30 each 11     polyvinyl alcohol (ARTIFICIAL TEARS, POLYVIN ALC,) 1.4 % ophthalmic solution as needed.        potassium chloride SA (K-DUR,KLOR-CON) 20 MEQ tablet Take 1 tablet (20 mEq total) by mouth daily. 90 tablet 3     rOPINIRole (REQUIP) 0.5 MG tablet Take 1 tablet (0.5 mg total) by mouth 2 (two) times a day. 180 tablet 3     senna (SENOKOT) 8.6 mg tablet Take 1 tablet by mouth 2 (two) times a day as needed for constipation. 1 tablet BID as needed for constipation       simethicone (GAS-X) 80 MG chewable tablet Chew 80 mg 3 (three) times a day.        sodium chloride (OCEAN) 0.65 %  nasal spray 1 spray into each nostril as needed for congestion. Install 2 sprays into both nostrils every 4 hours as needed for congestion       TAB-A-HU per tablet 1 TAB BY MOUTH DAILY (DX: SUPPLEMENT) 100 tablet 3     topiramate (TOPAMAX) 100 MG tablet 1 TAB BY MOUTH TWICE DAILY (DX:MOOD DISORDER) 60 tablet 10     traZODone (DESYREL) 150 MG tablet Take 200 mg by mouth bedtime. Per Dr. Crain        triamcinolone (KENALOG) 0.1 % cream Apply topically 3 (three) times a day as needed. 80 g 1     trospium (SANCTURA) 20 mg tablet Take 20 mg by mouth 2 (two) times a day.       vitamin E 400 UNIT capsule Take 400 Units by mouth daily. 90 capsule 3     Current Facility-Administered Medications   Medication Dose Route Frequency Provider Last Rate Last Dose     denosumab 60 mg (PROLIA 60 mg/ml)  60 mg Subcutaneous Q6 Months Eusebio Lerma,    60 mg at 02/15/17 1420     lidocaine 2 % jelly (XYLOCAINE)   Topical PRN Boogie Cedeño MD   1 application at 03/14/17 1351       Total data points:4

## 2021-06-09 NOTE — PROGRESS NOTES
Admission History & Physical  Michelle Moya, 1945, [id number]    [facility] [address]  Eusebio Lerma DO, [phone]    Extended Emergency Contact Information  Primary Emergency Contact: NicholasSantosh   [facility]  Home Phone: [phone]  Relation: Child  Secondary Emergency Contact: Jasmin Crawford   [facility]  Home Phone: [phone]  Relation: Cousin     Assessment and Plan:   Assessment: Hallux abductovalgus and hammertoe second toe all right foot  Plan: I recommended a gel toe separator and modified shoe gear.  Active Problems:    * No active hospital problems. *      Chief Complaint:  painful bunion right foot      HPI:    Michelle Moya is a 71 y.o. old female who presented to the clinic today complaining of a painful bunion involving her right foot.  She has had this bunion for several years.  She stated that she is finding it more difficult to wear shoes without pain.  She has suffered a TIA and has significant sequela.   History is provided by patient    Medical History  Active Ambulatory (Non-Hospital) Problems    Diagnosis     Sacral decubitus ulcer, stage II     Systemic lupus erythematosus     Osteoporosis     Obstructive sleep apnea syndrome     Hypothyroidism     Gastroesophageal reflux disease     Cerebrovascular disease     Cardiomyopathy     History of hemorrhagic stroke with residual hemiparesis     Colostomy in place     History of rheumatoid arthritis     Distal radial fracture, right, closed, initial encounter     ASD (atrial septal defect)     Aortic regurgitation     Senile osteoporosis     Scoliosis     Rheumatoid arthritis of multiple sites with negative rheumatoid factor     Chronic Pain     Lower Back Pain Chronic     Neuralgia     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     Idiopathic Peripheral Neuropathy     Lumbar Spondylosis     Anxiety     Depression     Heart Disease     Retention of urine     History of colostomy      Chronic coronary artery disease     Arthropathy of hand     Past Medical History:   Diagnosis Date     Alcohol abuse      Anemia      Arthritis      Cardiomyopathy      Cataract      Chronic pain 2006     Depression      Disease of thyroid gland      GERD (gastroesophageal reflux disease)      Heart disease      Incontinence in female      Lumbar pain      Mitral valve disorder      Ostium secundum atrial septal defect 1998     PFO (patent foramen ovale) 1998     Sleep apnea      Stroke 1998     Stroke      Patient Active Problem List    Diagnosis Date Noted     Sacral decubitus ulcer, stage II 02/20/2017     Systemic lupus erythematosus 01/31/2017     Osteoporosis 01/31/2017     Obstructive sleep apnea syndrome 01/31/2017     Hypothyroidism 01/31/2017     Gastroesophageal reflux disease 01/31/2017     Cerebrovascular disease 01/31/2017     Cardiomyopathy 01/31/2017     History of hemorrhagic stroke with residual hemiparesis 12/12/2016     Colostomy in place 05/16/2016     History of rheumatoid arthritis 01/28/2016     Distal radial fracture, right, closed, initial encounter 12/08/2015     ASD (atrial septal defect) 10/19/2015     Aortic regurgitation 09/15/2015     Senile osteoporosis 06/30/2015     Scoliosis      Rheumatoid arthritis of multiple sites with negative rheumatoid factor      Chronic Pain      Lower Back Pain Chronic      Neuralgia      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      Idiopathic Peripheral Neuropathy      Lumbar Spondylosis      Anxiety      Depression      Heart Disease      Retention of urine 02/13/2010     History of colostomy 02/13/2010     Chronic coronary artery disease 02/13/2010     Arthropathy of hand 02/12/2010     Surgical History  She  has a past surgical history that includes arthrodesis ant interbody min discectomy,lumbar (2013); Hernia repair (2015); Hysterectomy (1977); Joint replacement; Colostomy; Gastric bypass; Appendectomy; Cholecystectomy; Ankle  surgery (Left); revise median n/carpal tunnel surg (Right, 10/20/2015); ORIF distal radius fracture (Right, 10/20/2015); and Glaucoma surgery.   Past Surgical History:   Procedure Laterality Date     ANKLE SURGERY Left      APPENDECTOMY       CHOLECYSTECTOMY       COLOSTOMY      FOR CHRONIC FECAL INCONTINENCE     GASTRIC BYPASS      REVERSAL     GLAUCOMA SURGERY      laser     HERNIA REPAIR  2015    obstruction, colostomy     HYSTERECTOMY  1977     JOINT REPLACEMENT      hip, knee     ORIF DISTAL RADIUS FRACTURE Right 10/20/2015     VT ARTHRODESIS ANT INTERBODY MIN DISCECTOMY,LUMBAR  2013    Description: Lumbar Vertebral Fusion;  Recorded: 08/11/2011;     VT REVISE MEDIAN N/CARPAL TUNNEL SURG Right 10/20/2015    Procedure: RIGHT CARPAL TUNNEL RELEASE;  Surgeon: Luis A Perea MD;  Location[facility];  Service: Hand    Social History  Reviewed, and she  reports that she has never smoked. She does not have any smokeless tobacco history on file. She reports that she does not drink alcohol or use illicit drugs.  Social History   Substance Use Topics     Smoking status: Never Smoker     Smokeless tobacco: Not on file     Alcohol use No      Allergies  Allergies   Allergen Reactions     Iodine Anaphylaxis     Nickel Hives     Pineapple Anaphylaxis     Pineapple Anaphylaxis     Shellfish Containing Products Anaphylaxis     Amlodipine      swelling     Beta-Blockers (Beta-Adrenergic Blocking Agts) Other (See Comments)     Blood pressure drops     Gabapentin Other (See Comments)     Blood pressure drops     Latex Hives     Latex Unknown     Mushroom Comb No 1      Doesn't like     Oxycodone-Acetaminophen Nausea And Vomiting     Pregabalin Unknown     Tomato (Solanum Lycopersicum) Hives and Itching     Cooked tomatoes are ok. Raw are not.     Codeine Rash     Tomato Rash     Fresh tomatoes only-can eat cooked    Family History  Reviewed, and family history includes Breast cancer in her maternal aunt and  maternal aunt; COPD in her father; Cancer in her father; Heart disease in her maternal grandfather, maternal grandmother, and mother; Rheumatic fever in her mother; Schizophrenia in her daughter; Stroke in her father.   Psychosocial Needs  Social History     Social History Narrative    ** Merged History Encounter **          Additional psychosocial needs reviewed per nursing assessment.       Prior to Admission Medications     (Not in a hospital admission)        Review of Systems - Negative    Visit Vitals     /56     Pulse 80     SpO2 95%       Objective findings: Gen.: The patient is alert and in no acute distress      Integument: Nails bilateral feet normal length and color. Skin bilateral feet warm supple and intact.      Vascular: DP and PT pulses +1 over 4 bilateral feet. Capillary refill less than 2 seconds bilateral feet.      Neurologic: Negative clonus, negative Babinski bilateral feet.      Musculoskeletal: Range of motion within normal limits bilateral feet. Muscle power +3 over 5 bilaterally in all components.  There is a large firm subcutaneous mass on the medial aspect of the head of the first metatarsal.    There is lateral deviation of the right great toe which buttresses the second toe right foot.  There is mild pain on range of motion of the first MPJ right foot.      Assessment: Hallux abductovalgus and hammertoe second toe right foot      Plan: I recommended the patient place a toe separator between the first and second toes of both feet.  I recommended appropriate shoe gear to accommodate her bunion.  I did inform the patient that she is not a good candidate for surgical correction of her painful bunion deformity.

## 2021-06-09 NOTE — PROGRESS NOTES
Plastic Surgery Assessment Note    Today's Visit:  Pt is doing well and has no concerns or complaints.  Pt is tolerating the dressings well.  Pt has no fever or chills.      Labs:  No results for input(s): CRP, HGBA1C, LABPRE, ALBUMIN in the last 72 hours.    Invalid input(s): CDIFF, HEM    Vitals:  Vitals:    03/14/17 1349   BP: 90/62   Pulse: 80   Resp: 16   Temp: 98.8  F (37.1  C)       Francisco:           There is no height or weight on file to calculate BMI.    Output:                       Physical Exam:  General Appearance:  Appears comfortable, Alert, cooperative, no distress,   Head: Normocephalic, without obvious abnormality, atraumatic  Eyes: PERRL, conjunctiva/corneas clear, EOM's intact, both eyes   Nose: Nares normal, no drainage   Throat: Lips, mucosa, and tongue normal; teeth and gums normal  Neck: Supple, symmetrical, trachea midline, no adenopathy;    Lungs: Clear to auscultation bilaterally, respirations unlabored  Chest Wall: No tenderness or deformity  Heart: Regular rate and rhythm, S1 and S2 normal, no murmur, rubs or gallop  Abdomen: Soft, non-tender, bowel sounds active all four quadrants,   no masses, no organomegaly  Extremities: Extremities normal, atraumatic, no cyanosis or edema  Pulses: DP pulses are 1-2+ bilat.    Skin: no rashes or lesions  Neurologic: normal and equal strength bilat in upper and lower extremities    Wound Ostomy Assessment/Plan:    Wound 11/22/16 Coccyx (Active)   Pre Size Length 0.5 3/14/2017  1:00 PM   Pre Size Width 1 3/14/2017  1:00 PM   Pre Total Sq cm 0.5 3/14/2017  1:00 PM       Wound 03/14/17 right sacral (Active)   Pre Size Length 0.5 3/14/2017  1:00 PM   Pre Size Width 1 3/14/2017  1:00 PM   Pre Total Sq cm 0.5 3/14/2017  1:00 PM         There are stage 2 coccyx pressure ulcer. The right and left lateral edges of the buttock pressure ulcers have epithelized and healed. The coccyx still has some necrotic sq at the center of the base of the ulcer. They are  very tender to palpation but there is no erythema, drainage or pus. The dressings were all removed. The dressings were all removed.  Using a currette, incisional debridement of the coccyx was done. Debrided 0.5x1 cm of necrotic sq to healthy bleeding tissue. Hemostasis was well obtained.  She tolerated it well and no complications were noted. Dresssings were reapplied.   I recommend sacral foam dressings. This should continue to heal secondarily. I discussed with her the importance of offloading and good nutrition. She is seeing Dr Mendez for urinary incontinence evaluation. Follow up 1 month.       Boogie Cedeño MD

## 2021-06-09 NOTE — PROGRESS NOTES
Documentation only encounter to record results of depression screening.  Depression screening completed via [employer] (formally, [employer]).  See flow sheet for screening.

## 2021-06-09 NOTE — PROGRESS NOTES
Plastic Surgery Assessment Note    Today's Visit:  Pt is doing well and has no concerns or complaints.  Pt is tolerating the dressings well.  Pt has no fever or chills.      Labs:  No results for input(s): CRP, HGBA1C, LABPRE, ALBUMIN in the last 72 hours.    Invalid input(s): CDIFF, HEM    Vitals:  Vitals:    03/07/17 1309   BP: 110/62   Pulse: 80   Resp: 16   Temp: 100.2  F (37.9  C)       Francisco:           There is no height or weight on file to calculate BMI.    Output:                       Physical Exam:  General Appearance:  Appears comfortable, Alert, cooperative, no distress,   Head: Normocephalic, without obvious abnormality, atraumatic  Eyes: PERRL, conjunctiva/corneas clear, EOM's intact, both eyes   Nose: Nares normal, no drainage   Throat: Lips, mucosa, and tongue normal; teeth and gums normal  Neck: Supple, symmetrical, trachea midline, no adenopathy;    Lungs: Clear to auscultation bilaterally, respirations unlabored  Chest Wall: No tenderness or deformity  Heart: Regular rate and rhythm, S1 and S2 normal, no murmur, rubs or gallop  Abdomen: Soft, non-tender, bowel sounds active all four quadrants,   no masses, no organomegaly  Extremities: Extremities normal, atraumatic, no cyanosis or edema  Pulses: DP pulses are 1-2+ bilat.    Skin: no rashes or lesions  Neurologic: normal and equal strength bilat in upper and lower extremities    Wound Ostomy Assessment/Plan:  Wound 11/22/16 Coccyx (Active)   Pre Size Length 0.5 3/7/2017  1:00 PM   Pre Size Width 0.5 3/7/2017  1:00 PM   Pre Total Sq cm 0.25 3/7/2017  1:00 PM         There are stage 3 coccyx pressure ulcer that is new over the past 2-3 weeks. The right and left lateral edges of the buttock pressure ulcers have epithelized and healed. The coccyx still has some necrotic sq at the center of the base of the ulcer. They are very tender to palpation but there is no erythema, drainage or pus. The dressings were all removed. New dresssings were reapplied.    I recommend sacral foam dressings. This should continue to heal secondarily. I discussed with her the importance of offloading and good nutrition. She is seeing Dr Mendez for urinary incontinence evaluation.  Follow up 1 month.    Boogie Cedeño MD

## 2021-06-10 NOTE — PROGRESS NOTES
Plastic Surgery Assessment Note    Today's Visit:  Pt is doing well and has no concerns or complaints.  Pt is tolerating the dressings well.  Pt has no fever or chills.      Labs:  No results for input(s): CRP, HGBA1C, LABPRE, ALBUMIN in the last 72 hours.    Invalid input(s): CDIFF, HEM    Vitals:  There were no vitals filed for this visit.    Francisco:           There is no height or weight on file to calculate BMI.    Output:                       Physical Exam:  General Appearance:  Appears comfortable, Alert, cooperative, no distress,   Head: Normocephalic, without obvious abnormality, atraumatic  Eyes: PERRL, conjunctiva/corneas clear, EOM's intact, both eyes   Nose: Nares normal, no drainage   Throat: Lips, mucosa, and tongue normal; teeth and gums normal  Neck: Supple, symmetrical, trachea midline, no adenopathy;    Lungs: Clear to auscultation bilaterally, respirations unlabored  Chest Wall: No tenderness or deformity  Heart: Regular rate and rhythm, S1 and S2 normal, no murmur, rubs or gallop  Abdomen: Soft, non-tender, bowel sounds active all four quadrants,   no masses, no organomegaly  Extremities: Extremities normal, atraumatic, no cyanosis or edema  Pulses: DP pulses are 1-2+ bilat.    Skin: no rashes or lesions  Neurologic: normal and equal strength bilat in upper and lower extremities    Wound Ostomy Assessment/Plan:  Wound 11/22/16 Coccyx (Active)   Pre Size Length 1 5/30/2017  2:00 PM   Pre Size Width 1 5/30/2017  2:00 PM   Pre Total Sq cm 1 5/30/2017  2:00 PM   Description pink 5/9/2017 12:00 PM   Prodcut Used Foam 5/30/2017  2:00 PM       Wound 03/14/17 right sacral (Active)   Pre Size Length 2 5/30/2017  2:00 PM   Pre Size Width 3 5/30/2017  2:00 PM   Pre Size Depth 1.1 5/30/2017  2:00 PM   Pre Total Sq cm 6 5/30/2017  2:00 PM   Description adherent brownish colored slough 5/9/2017 12:00 PM   Prodcut Used Foam 5/30/2017  2:00 PM         There is stage 4 coccyx pressure ulcer. The dressings were  all removed. The right and left lateral edges of the buttock pressure ulcers have some epithelized but the left butt ulcer has some necrotic sq. The coccyx still has some necrotic sq at the center of the base of the ulcer. It is very tender to palpation and there is slight erythema, drainage but no pus. Using a pickup and scalpel, excisional debridement of the coccyx was done. Debrided 2x2 cm of necrotic sq to healthy bleeding tissue. Hemostasis was well obtained. She tolerated it well and no complications were noted. Dresssings were reapplied. I recommend santyl and calcium alginate dressings. I recommend VAC once the ulcer is .  F/u 1 month.      Boogie Cedeño MD

## 2021-06-10 NOTE — PROGRESS NOTES
ASSESSMENT:  1. Memory loss  Michelle Moya is a 71-year-old woman who presents for follow-up.  She was concerned that sometime in November she had a stroke, but did not report this.  She had some left-sided weakness, was referred to neurology, but did not follow-up with CT recommended.  She did complete physical therapy, however.  She has noticed some memory loss even predating this.  She speaks intermittently with some dysarthria, though I wonder if depression or medications could be contributing as well.  I recommended referral neuropsych evaluation.  - Neuropsychological assessment; Future    2. Chronic rhinitis  She has chronic runny nose, did not improve with Flonase, though she still takes this.  I recommended trying ipratropium nasal spray in its place.  I had previously recommended an allergist, though she has not seen them yet.  I am not sure if this is allergic rhinitis.  I see no signs of sinus infection today, though was called over the weekend out of concern that she had one.  She has been on Augmentin for a decubitus wound, which may have helped.    3. Essential hypertension with goal blood pressure less than 140/90  Blood pressure is actually low, she has been feeling drowsy, weak.  I recommended stopping lisinopril.  I think she is currently on 10 mg per day.  She continues on carvedilol, however.  I will let her primary cardiologist Dr. Chaudhry know as well.  We will also have to find a pediatric cuff for her, since her arms are so small to ensure we are getting an accurate blood pressure.    4. History of hemorrhagic stroke with residual hemiparesis  History of stroke noted, see #1 for recent workup.    5. Screening for colon cancer  Recommended stool cards, I have a hard time seeing her having a colonoscopy.      Polypharmacy is a big issue for her, may be contributing to some of her symptoms.  I would like to meet with her next month to try to whittle down the list.  The last time we did this,  she was hesitant to stop any of her medications, since they all had a purpose.    For insomnia, I would first want to cut back on the afternoon dose of Ritalin before lunch into a major investigation or treatment plan.    PLAN:  Patient Instructions   Memory test -- neuropsych evaluation    STOP lisinopril, saltwater nose spray, and fluticasone nasal spray    START ipratropium nasal spray          Orders Placed This Encounter   Procedures     Neuropsychological assessment     Standing Status:   Future     Standing Expiration Date:   5/17/2018     Scheduling Instructions:      Has seen Demar Ellison in [place] ([facility]) previously if available     Medications Discontinued During This Encounter   Medication Reason     levomilnacipran (FETZIMA) 120 mg Cs24 ER capsule Duplicate order     hydroxychloroquine (PLAQUENIL) 200 mg tablet Duplicate order     lisinopril (PRINIVIL,ZESTRIL) 5 MG tablet      fluticasone (FLONASE) 50 mcg/actuation nasal spray        Return in about 1 month (around 6/17/2017).    CHIEF COMPLAINT:  Chief Complaint   Patient presents with     Follow-up     F/U Dr Huffman - Neuro and PT      Sinusitis       HISTORY OF PRESENT ILLNESS:  Michelle is a 71 y.o. female presenting to the clinic today with complaints of sinus pain.  She feels that she has a sinus infection as her face continues to be very tender to the touch with constant facial pain throughout the eye. Particularly, she has significant pain under her right eye. During the day her nose seems to be very dry but then at night her nose is very runny. She sneezes on occasion which she characterizes as violent.  Over mother's day she produced an orange colored mucus. She tried using fluticasone nasal spray but noted no improvement.     Fatigue:  She feels exhausted throughout the day yet has trouble sleeping when she gets into bed. She has been taking trazodone but is unsure if she takes it regularly. She often falls asleep in her wheelchair  "or on the toilet. She continues to take ritalin to increase focus.     Extremity weakness:   She states that her legs have been painful and physical therapy has only exacerbated this. She also mentions that she hit her right leg on concrete a few days ago when her wheelchair tipped over while going over a curb. She also fell out of bed about a week ago, also striking her right knee. She has gained strength in her right leg but has had no improvement in left leg strength. She has been advised to use her arms more but has not gone through therapy for her arms. 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     Memory loss:  She reports that her memory has declined precipitously recently and she can no longer recognize her friends. Her son has suggested she get into cognitive testing. She has also not been going out often as she is somewhat embarrassed when she forgets who her friends are. Her PCA and family have also expressed grave concerns about her decline in short term memory. She recalls having cognitive testing at the [facility] with someone named Seth Ellison. She has been missing appointments and no longer feels like herself. When she realizes how far she has declined she begins to cry and feels very down.     REVIEW OF SYSTEMS:   Health maintenance:  She is due for colon screening.   All other systems are negative.    PFSH:  She reports that she had a lapse in insurance for a few days.     TOBACCO USE:  History   Smoking Status     Never Smoker   Smokeless Tobacco     Not on file       VITALS:  Vitals:    05/17/17 1340   BP: (!) 88/50   Patient Site: Right Arm   Patient Position: Sitting   Cuff Size: Adult Regular   Pulse: 88   Resp: 18   SpO2: 97%   Weight: 105 lb (47.6 kg)   Height: 4' 7\" (1.397 m)     Wt Readings from " Last 3 Encounters:   05/17/17 105 lb (47.6 kg)   04/05/17 102 lb (46.3 kg)   02/20/17 107 lb 4.8 oz (48.7 kg)       PHYSICAL EXAM:  General: Frail appearing.  Sitting in electric wheelchair.   ENT: Sclera anicteric, oral mucosa moist  Endocrine: No goiter  Heart:  Regular rate and rhythm, no murmurs.   Lungs:  Clear   Neuro: No facial droop. Slight decrease in left hand  strength. Mild dysarthria with speech.   Psychiatric: Depressed affect  Skin: No rashes, decubitus ulcer not examined today    ADDITIONAL HISTORY SUMMARIZED (2): Notes from [facility] reviewed from 4/2017 regarding OAB. Notes from Dr. Cedeño reviewed from 5/9/2017 regarding wound care.   DECISION TO OBTAIN EXTRA INFORMATION (1): Care everywhere accesssed.   RADIOLOGY TESTS (1): None.  LABS (1): Labs from 4/2017 reviewed.  MEDICINE TESTS (1): None.  INDEPENDENT REVIEW (2 each): None.     The visit lasted a total of 37 minutes face to face with the patient. Over 50% of the time was spent counseling and educating the patient about sinusitis.    IEusebio, am scribing for and in the presence of, Dr. Lerma.    IDr. Lerma, personally performed the services described in this documentation, as scribed by Eusebio Monson in my presence, and it is both accurate and complete.    MEDICATIONS:  Current Outpatient Prescriptions   Medication Sig Dispense Refill     acetaminophen (TYLENOL) 500 MG tablet Take 500-1,000 mg by mouth as needed. For headaches        albuterol (PROVENTIL HFA;VENTOLIN HFA) 90 mcg/actuation inhaler Inhale 2 puffs every 4 (four) hours as needed for wheezing.       amoxicillin-clavulanate (AUGMENTIN) 500-125 mg per tablet Take 1 tablet (500 mg total) by mouth 2 (two) times a day for 10 days. 20 tablet 0     aspirin 325 MG tablet Take 1 tablet (325 mg total) by mouth daily. 90 tablet 3     atorvastatin (LIPITOR) 10 MG tablet 1 TAB BY MOUTH DAILY (CHOLESTEROL) 30 tablet 11     calcium citrate (CALCITRATE) 200 mg (950 mg) tablet  "Take 2.5 tablets (500 mg total) by mouth 3 (three) times a day. 675 tablet 1     carvedilol (COREG) 12.5 MG tablet Take 1 tablet (12.5 mg total) by mouth 2 (two) times a day with meals. 180 tablet 3     cetirizine (ZYRTEC) 10 MG tablet Take 1 tablet (10 mg total) by mouth every morning. 90 tablet 3     cholecalciferol, vitamin D3, 1,000 unit tablet Take 3 tablets (3,000 Units total) by mouth daily. 270 tablet 3     clonazePAM (KLONOPIN) 0.5 MG tablet Take 0.25 mg by mouth 2 (two) times a day. Every morning and lunch        clonazePAM (KLONOPIN) 0.5 MG tablet Take 0.5 mg by mouth bedtime.        conjugated estrogens (PREMARIN) vaginal cream Apply pea size amount on finger and apply to vaginal area 1-2x week. 42.5 g 11     cyanocobalamin (VITAMIN B-12) 1000 MCG tablet Take 1 tablet (1,000 mcg total) by mouth daily. 90 tablet 3     cycloSPORINE (RESTASIS) 0.05 % ophthalmic emulsion Administer 1 drop to both eyes 2 (two) times a day.       dicyclomine (BENTYL) 10 MG capsule Take 1 capsule (10 mg total) by mouth every morning. And two other doses as needed daily 90 capsule 3     EPINEPHrine (EPIPEN) 0.3 mg/0.3 mL (1:1,000) atIn Inject 0.3 mg into the shoulder, thigh, or buttocks once.       estradiol (ESTRACE) 0.01 % (0.1 mg/gram) vaginal cream Place pea size amount to finger and apply to vaginal area 1-2x per week 42.5 g 0     famotidine (PEPCID) 20 MG tablet Take 1 tablet (20 mg total) by mouth 2 (two) times a day. 60 tablet 1     ferrous sulfate 325 mg (65 mg iron) CpER Take 1 tablet by mouth every morning. 30 capsule 5     foam bandage 3 X 3 \" Bndg Apply 1 application topically daily. 30 each 1     furosemide (LASIX) 20 MG tablet Take 20 mg by mouth daily, and take second afternoon dose if weight is over 106 lbs. 90 tablet 3     hydrocolloid dressing (DUODERM CGF DRESSING) 4 X 4 \" Bndg Apply 1 patch topically 2 (two) times a week. 10 each 1     hydroxychloroquine (PLAQUENIL) 200 mg tablet TAKE 1 TABLET (200 MG TOTAL) " BY MOUTH DAILY. (LUPUS) 30 tablet 10     hydrOXYzine (ATARAX) 25 MG tablet Take 25 mg by mouth 3 (three) times a day as needed for itching.       levomilnacipran (FETZIMA) 120 mg Cs24 ER capsule Take 1 capsule (120 mg total) by mouth daily. 90 capsule 3     levothyroxine (SYNTHROID, LEVOTHROID) 75 MCG tablet Take 1 tablet (75 mcg total) by mouth Daily at 6:00 am. 90 tablet 3     lipase-protease-amylase (CREON) 12,000-38,000 -60,000 unit CpDR capsule Take 1 capsule (12,000 units of lipase total) by mouth 3 (three) times a day with meals. 270 capsule 3     methylphenidate (RITALIN) 5 MG tablet Take 15 mg by mouth every morning. And 10mg at noon       morphine (MS CONTIN) 15 MG 12 hr tablet Take 1 tablet (15 mg total) by mouth every 12 (twelve) hours. 720 tablet 0     mupirocin (BACTROBAN) 2 % ointment Apply topically 3 (three) times a day.       nitroglycerin (NITROSTAT) 0.4 MG SL tablet Place 0.4 mg under the tongue every 5 (five) minutes as needed for chest pain.       OMEGA-3/DHA/EPA/FISH OIL (FISH OIL-OMEGA-3 FATTY ACIDS) 300-1,000 mg capsule Take 1 g by mouth daily. Indications: Hypertriglyceridemia       omeprazole (PRILOSEC) 20 MG capsule 1 CAP BY MOUTH TWICE DAILY - TAKE 30 MINUTES BEFORE A MEAL - DO NOT CRUSH (DX: GERD) 60 capsule 11     ondansetron (ZOFRAN) 4 MG tablet Take 4 mg by mouth every 8 (eight) hours as needed for nausea.       polyethylene glycol (MIRALAX) 17 gram packet Take 1 packet (17 g total) by mouth every other day. 17 GM in 8 oz of water daily as needed 30 each 11     polyvinyl alcohol (ARTIFICIAL TEARS, POLYVIN ALC,) 1.4 % ophthalmic solution as needed.        potassium chloride SA (K-DUR,KLOR-CON) 20 MEQ tablet Take 1 tablet (20 mEq total) by mouth daily. 90 tablet 3     pseudoephedrine (SUDAFED) 30 MG tablet Take 30 mg by mouth every 6 (six) hours as needed for congestion. Take 1-2 tablets every 6 hours as needed for nasal congestion  Indications: Nasal Congestion       rOPINIRole  (REQUIP) 0.5 MG tablet Take 1 tablet (0.5 mg total) by mouth 2 (two) times a day. 180 tablet 3     senna (SENOKOT) 8.6 mg tablet Take 1 tablet by mouth 2 (two) times a day as needed for constipation. 1 tablet BID as needed for constipation       simethicone (GAS-X) 80 MG chewable tablet Chew 80 mg 3 (three) times a day.        sodium chloride (OCEAN) 0.65 % nasal spray 1 spray into each nostril as needed for congestion. Install 2 sprays into both nostrils every 4 hours as needed for congestion       TAB-A-HU per tablet 1 TAB BY MOUTH DAILY (DX: SUPPLEMENT) 100 tablet 3     TAMSULOSIN HCL (TAMSULOSIN ORAL) Take 0.4 mg by mouth once daily. Indications: bladder control       topiramate (TOPAMAX) 100 MG tablet 1 TAB BY MOUTH TWICE DAILY (DX:MOOD DISORDER) 60 tablet 10     traZODone (DESYREL) 150 MG tablet Take 200 mg by mouth bedtime. Per Dr. Crain        triamcinolone (KENALOG) 0.1 % cream Apply topically 3 (three) times a day as needed. 80 g 1     trospium (SANCTURA) 20 mg tablet TAKE 1 TABLET BY MOUTH TWICE A  tablet 3     vitamin E 400 UNIT capsule Take 400 Units by mouth daily. 90 capsule 3     ipratropium (ATROVENT) 0.06 % nasal spray 2 sprays into each nostril 3 (three) times a day. 15 mL 2     Current Facility-Administered Medications   Medication Dose Route Frequency Provider Last Rate Last Dose     denosumab 60 mg (PROLIA 60 mg/ml)  60 mg Subcutaneous Q6 Months Eusebio Lerma DO   60 mg at 02/15/17 1420     lidocaine 2 % jelly (XYLOCAINE)   Topical PRN Boogie Cedeño MD   1 application at 03/14/17 1351       Total data points: 4.    Eusebio Lerma DO  Internal Medicine  [facility]

## 2021-06-10 NOTE — PROGRESS NOTES
Plastic Surgery Assessment Note    Today's Visit:  Pt is complaining about discomfort at the coccyx ulcer.  Pt is tolerating the dressings well.  Pt has no fever or chills. She notes increase drainage.      Labs:  No results for input(s): CRP, HGBA1C, LABPRE, ALBUMIN in the last 72 hours.    Invalid input(s): CDIFF, HEM    Vitals:  Vitals:    05/09/17 1248   BP: 120/64   Pulse: 64   Resp: 16   Temp: 99.9  F (37.7  C)       Francisco:           There is no height or weight on file to calculate BMI.    Output:                       Physical Exam:  General Appearance:  Appears comfortable, Alert, cooperative, no distress,   Head: Normocephalic, without obvious abnormality, atraumatic  Eyes: PERRL, conjunctiva/corneas clear, EOM's intact, both eyes   Nose: Nares normal, no drainage   Throat: Lips, mucosa, and tongue normal; teeth and gums normal  Neck: Supple, symmetrical, trachea midline, no adenopathy;    Lungs: Clear to auscultation bilaterally, respirations unlabored  Chest Wall: No tenderness or deformity  Heart: Regular rate and rhythm, S1 and S2 normal, no murmur, rubs or gallop  Abdomen: Soft, non-tender, bowel sounds active all four quadrants,   no masses, no organomegaly  Extremities: Extremities normal, atraumatic, no cyanosis or edema  Pulses: DP pulses are 1-2+ bilat.    Skin: no rashes or lesions  Neurologic: normal and equal strength bilat in upper and lower extremities    Wound Ostomy Assessment/Plan:      Wound 11/22/16  L Coccyx (Active)   Pre Size Length 0.8 5/9/2017 12:00 PM   Pre Size Width 0.8 5/9/2017 12:00 PM   Pre Total Sq cm 6.4 5/9/2017 12:00 PM   Description pink 5/9/2017 12:00 PM   Prodcut Used Foam 5/9/2017 12:00 PM       Wound 03/14/17 right sacral (Active)   Pre Size Length 2.2 5/9/2017 12:00 PM   Pre Size Width 2.5 5/9/2017 12:00 PM   Pre Size Depth 1 5/9/2017 12:00 PM   Pre Total Sq cm 5.5 5/9/2017 12:00 PM   Description adherent brownish colored slough 5/9/2017 12:00 PM   Prodcut Used  Foam 5/9/2017 12:00 PM         There is stage 4 coccyx pressure ulcer. The dressings were all removed. The right and left lateral edges of the buttock pressure ulcers have some epithelized but the left butt ulcer has some necrotic sq. The coccyx still has some necrotic sq at the center of the base of the ulcer. It is very tender to palpation and there is slight erythema, drainage but no pus. Using a pickup and scalpel, excisional debridement of the coccyx was done. Debrided 2x2 cm of necrotic sq to healthy bleeding tissue. Hemostasis was well obtained. She tolerated it well and no complications were noted. Dresssings were reapplied.   I recommend santyl and silvercell dressings. I also gave her RX for augmentin 500mg bid for 10 days.  She complains of vaginal discomfort and I told her to see her PMD for that.  I discussed with her the importance of offloading and good nutrition. She is seeing Dr Mendez for urinary incontinence evaluation. Follow up 1 week.  She will need serial bedside debridement.          Boogie Cedeño MD

## 2021-06-10 NOTE — PROGRESS NOTES
Plastic Surgery Assessment Note    Today's Visit:  Pt is doing well and has no concerns or complaints.  Pt is tolerating the dressings well.  Pt has no fever or chills.      Labs:  No results for input(s): CRP, HGBA1C, LABPRE, ALBUMIN in the last 72 hours.    Invalid input(s): CDIFF, HEM    Vitals:  Vitals:    04/18/17 1357   BP: 100/60   Pulse: 67   Resp: 18   Temp: 98.2  F (36.8  C)       Francisco:           There is no height or weight on file to calculate BMI.    Output:                       Physical Exam:  General Appearance:  Appears comfortable, Alert, cooperative, no distress,   Head: Normocephalic, without obvious abnormality, atraumatic  Eyes: PERRL, conjunctiva/corneas clear, EOM's intact, both eyes   Nose: Nares normal, no drainage   Throat: Lips, mucosa, and tongue normal; teeth and gums normal  Neck: Supple, symmetrical, trachea midline, no adenopathy;    Lungs: Clear to auscultation bilaterally, respirations unlabored  Chest Wall: No tenderness or deformity  Heart: Regular rate and rhythm, S1 and S2 normal, no murmur, rubs or gallop  Abdomen: Soft, non-tender, bowel sounds active all four quadrants,   no masses, no organomegaly  Extremities: Extremities normal, atraumatic, no cyanosis or edema  Pulses: DP pulses are 1-2+ bilat.    Skin: no rashes or lesions  Neurologic: normal and equal strength bilat in upper and lower extremities    Wound Ostomy Assessment/Plan:  Wound 11/22/16  L Coccyx (Active)   Pre Size Length 1.2 4/18/2017  2:00 PM   Pre Size Width 1.2 4/18/2017  2:00 PM   Pre Total Sq cm 1.44 4/18/2017  2:00 PM   Prodcut Used Foam 4/18/2017  2:00 PM       Wound 03/14/17 right sacral (Active)   Pre Size Length 2.2 4/18/2017  2:00 PM   Pre Size Width 2 4/18/2017  2:00 PM   Pre Total Sq cm 4.4 4/18/2017  2:00 PM   Prodcut Used Foam 4/18/2017  2:00 PM           There is stage 4 coccyx pressure ulcer. The right and left lateral edges of the buttock pressure ulcers have some epithelized but the left  butt ulcer has some necrotic sq. The coccyx still has some necrotic sq at the center of the base of the ulcer. They are very tender to palpation but there is no erythema, drainage or pus. The dressings were all removed. The dressings were all removed. Using a pickup and scalpel, excisional debridement of the coccyx was done. Debrided 2x2 cm of necrotic sq to healthy bleeding tissue. Hemostasis was well obtained. She tolerated it well and no complications were noted. Dresssings were reapplied.   I recommend santyl and foam dressings. This should continue to heal secondarily. I discussed with her the importance of offloading and good nutrition. She is seeing Dr Mendez for urinary incontinence evaluation. Follow up 1 month.        Boogie Cedeño MD

## 2021-06-11 NOTE — PROGRESS NOTES
ASSESSMENT:  1. Recurrent falls while walking  Unclear what is causing the recurrent falls, but I suspect it is a combination of a number of things including her stooped forward gait, imbalance, possibly orthostatic lightheadedness.  I recommended stopping the clonazepam 0.25 mg twice a day which has been prescribed by her psychiatrist, Dr. Edilson Crain.  I also recommended stopping tamsulosin, prescribed by Dr. Ramirez at [facility].  The latter was prescribed for overactive bladder, she is already on an antispasmodic medication, I think that risk outweigh benefit of tamsulosin at this time.  She tells me she has completed home physical therapy.  For the time being, emphasized that she should not walk unsupported.  She can  place out of her wheelchair, but continues to be a high risk for falls.  --Follow-up in 2 weeks    2. Anxiety and depression  Please note change on clonazepam.  She is still on 0.5 mg at night.  I think that with the low dose, it is low risk for withdrawal    3. OAB (overactive bladder)  Please note changes regarding discontinuation of tamsulosin    4. Osteoporosis  Of note, she is on Prolia for severe osteoporosis    5. Chronic rhinitis  Ipratropium was started to replace Flonase at the last visit, she has noticed some benefit.         PLAN:  Patient Instructions   Stop clonazepam 0.25 mg twice a day (can continue the 0.5 mg dose at bedtime for now)    Stop tamsulosin    When moving around, make sure you are in the wheelchair, ok to stand      No orders of the defined types were placed in this encounter.    Medications Discontinued During This Encounter   Medication Reason     TAMSULOSIN HCL (TAMSULOSIN ORAL)      clonazePAM (KLONOPIN) 0.5 MG tablet        Return in about 2 weeks (around 6/14/2017) for Recheck.   All medication changes were reconciled.     CHIEF COMPLAINT:  Chief Complaint   Patient presents with     Follow-up     Fall       HISTORY OF PRESENT ILLNESS:  Michelle is  a 71 y.o. female presenting to the clinic today for an ER follow up and a follow up on falls. She fell on May 27th and she states that it was the worst fall she has ever experienced. She attributes the fall to her legs simply giving out and causing the fall, though there is some mention in the ER note of tripping over her cat's dishes. She had pain in her right ribs, right hip right shoulder and right head, though denies any loss of consciousness. However, she is emphatic there was no trigger aside from her legs crumpling beneath her.  Shortly after discharge she awoke from a short nap and fell once again. She has become scared to move and wonders if a partial cause of her falls might be medication changes. She has tried to continue physical therapy but has felt extremely tired, making this difficult for her.     Insomnia: She continues to feel drowsy, tired and weak. However, she continues to have great difficulty sleeping. She sleeps for about 4 hours a night but feels this is insufficient.     Memory loss:  She has been having a great deal of trouble keeping track of her appointments, dates and generally feels disoriented. She continues to take clonazepam as prescribed by her psychiatrist, Dr. Crain. She feels incapable of managing her current regimen and is seeking assistance for this.     Osteoporosis:  She is maintained on Prolia. Her DXA from 2015 showed a low T-score of -4.1 as measured at the left radius.     REVIEW OF SYSTEMS:   Sinus congestion:  She recently started ipratropium and has found her nose is slightly less stuffy.     Hypertension:  Lisinopril was stopped on 5/17/2017 and has noticed no change since stopping it.     Urinary retention:  She continues to take tamsulosin. However, she believes that her bladder control is gradually improving with the aid of acupuncture.   All other systems are negative.    PFSH:  She enjoys cooking. Her son lives in the area but she lives alone.     TOBACCO  USE:  History   Smoking Status     Never Smoker   Smokeless Tobacco     Not on file       VITALS:  Vitals:    05/31/17 1012   BP: 110/58   Pulse: 80   Weight: 103 lb 6.4 oz (46.9 kg)     Wt Readings from Last 3 Encounters:   05/31/17 103 lb 6.4 oz (46.9 kg)   05/27/17 102 lb (46.3 kg)   05/25/17 102 lb (46.3 kg)       PHYSICAL EXAM:  General:  Frail appearing. In motorized wheel chair   Psych: depressed affect.   Heart:  Regular rate and rhythm, no murmurs.   Lungs:  Clear   Neuro:  Unsteady gait, stooped forward.   Skin:  Bruise under her left eye.     ADDITIONAL HISTORY SUMMARIZED (2): Notes from ER from 5/27/2017 reviewed regarding fall.  DECISION TO OBTAIN EXTRA INFORMATION (1): Care everywhere accessed.   RADIOLOGY TESTS (1): CT from 5/27/2017 reviewed.  LABS (1): None.  MEDICINE TESTS (1): None.  INDEPENDENT REVIEW (2 each): None.     The visit lasted a total of 27 minutes face to face with the patient. Over 50% of the time was spent counseling and educating the patient about falls and balance.    IEusebio, am scribing for and in the presence of, Dr. Lerma.    IDr. Lerma, personally performed the services described in this documentation, as scribed by Eusebio Monson in my presence, and it is both accurate and complete.    MEDICATIONS:  Current Outpatient Prescriptions   Medication Sig Dispense Refill     acetaminophen (TYLENOL) 500 MG tablet Take 500-1,000 mg by mouth as needed. For headaches        albuterol (PROVENTIL HFA;VENTOLIN HFA) 90 mcg/actuation inhaler Inhale 2 puffs every 4 (four) hours as needed for wheezing.       aspirin 325 MG tablet Take 1 tablet (325 mg total) by mouth daily. 90 tablet 3     atorvastatin (LIPITOR) 10 MG tablet 1 TAB BY MOUTH DAILY (CHOLESTEROL) 30 tablet 11     calcium citrate (CALCITRATE) 200 mg (950 mg) tablet Take 2.5 tablets (500 mg total) by mouth 3 (three) times a day. 675 tablet 1     carvedilol (COREG) 12.5 MG tablet Take 1 tablet (12.5 mg total) by mouth  "2 (two) times a day with meals. 180 tablet 3     cetirizine (ZYRTEC) 10 MG tablet Take 1 tablet (10 mg total) by mouth every morning. 90 tablet 3     cholecalciferol, vitamin D3, 1,000 unit tablet Take 3 tablets (3,000 Units total) by mouth daily. 270 tablet 3     clonazePAM (KLONOPIN) 0.5 MG tablet Take 0.5 mg by mouth bedtime.        conjugated estrogens (PREMARIN) vaginal cream Apply pea size amount on finger and apply to vaginal area 1-2x week. 42.5 g 11     cyanocobalamin (VITAMIN B-12) 1000 MCG tablet Take 1 tablet (1,000 mcg total) by mouth daily. 90 tablet 3     cycloSPORINE (RESTASIS) 0.05 % ophthalmic emulsion Administer 1 drop to both eyes 2 (two) times a day.       dicyclomine (BENTYL) 10 MG capsule Take 1 capsule (10 mg total) by mouth every morning. And two other doses as needed daily 90 capsule 3     EPINEPHrine (EPIPEN) 0.3 mg/0.3 mL (1:1,000) atIn Inject 0.3 mg into the shoulder, thigh, or buttocks once.       estradiol (ESTRACE) 0.01 % (0.1 mg/gram) vaginal cream Place pea size amount to finger and apply to vaginal area 1-2x per week 42.5 g 0     famotidine (PEPCID) 20 MG tablet Take 1 tablet (20 mg total) by mouth 2 (two) times a day. 60 tablet 1     ferrous sulfate 325 mg (65 mg iron) CpER Take 1 tablet by mouth every morning. 30 capsule 5     foam bandage 3 X 3 \" Bndg Apply 1 application topically daily. 30 each 1     furosemide (LASIX) 20 MG tablet Take 20 mg by mouth daily, and take second afternoon dose if weight is over 106 lbs. 90 tablet 3     hydrocolloid dressing (DUODERM CGF DRESSING) 4 X 4 \" Bndg Apply 1 patch topically 2 (two) times a week. 10 each 1     hydroxychloroquine (PLAQUENIL) 200 mg tablet TAKE 1 TABLET (200 MG TOTAL) BY MOUTH DAILY. (LUPUS) 30 tablet 10     hydrOXYzine (ATARAX) 25 MG tablet Take 25 mg by mouth 3 (three) times a day as needed for itching.       ipratropium (ATROVENT) 0.06 % nasal spray 2 sprays into each nostril 3 (three) times a day. 15 mL 2     " levomilnacipran (FETZIMA) 120 mg Cs24 ER capsule Take 1 capsule (120 mg total) by mouth daily. 90 capsule 3     levothyroxine (SYNTHROID, LEVOTHROID) 75 MCG tablet Take 1 tablet (75 mcg total) by mouth Daily at 6:00 am. 90 tablet 3     lipase-protease-amylase (CREON) 12,000-38,000 -60,000 unit CpDR capsule Take 1 capsule (12,000 units of lipase total) by mouth 3 (three) times a day with meals. 270 capsule 3     methylphenidate (RITALIN) 5 MG tablet Take 15 mg by mouth every morning. And 10mg at noon       morphine (MS CONTIN) 15 MG 12 hr tablet Take 1 tablet (15 mg total) by mouth every 12 (twelve) hours. 720 tablet 0     mupirocin (BACTROBAN) 2 % ointment Apply topically 3 (three) times a day.       nitroglycerin (NITROSTAT) 0.4 MG SL tablet Place 0.4 mg under the tongue every 5 (five) minutes as needed for chest pain.       OMEGA-3/DHA/EPA/FISH OIL (FISH OIL-OMEGA-3 FATTY ACIDS) 300-1,000 mg capsule Take 1 g by mouth daily. Indications: Hypertriglyceridemia       omeprazole (PRILOSEC) 20 MG capsule 1 CAP BY MOUTH TWICE DAILY - TAKE 30 MINUTES BEFORE A MEAL - DO NOT CRUSH (DX: GERD) 60 capsule 11     ondansetron (ZOFRAN) 4 MG tablet Take 4 mg by mouth every 8 (eight) hours as needed for nausea.       polyethylene glycol (MIRALAX) 17 gram packet Take 1 packet (17 g total) by mouth every other day. 17 GM in 8 oz of water daily as needed 30 each 11     polyvinyl alcohol (ARTIFICIAL TEARS, POLYVIN ALC,) 1.4 % ophthalmic solution as needed.        potassium chloride SA (K-DUR,KLOR-CON) 20 MEQ tablet Take 1 tablet (20 mEq total) by mouth daily. 90 tablet 3     pseudoephedrine (SUDAFED) 30 MG tablet Take 30 mg by mouth every 6 (six) hours as needed for congestion. Take 1-2 tablets every 6 hours as needed for nasal congestion  Indications: Nasal Congestion       rOPINIRole (REQUIP) 0.5 MG tablet Take 1 tablet (0.5 mg total) by mouth 2 (two) times a day. 180 tablet 3     senna (SENOKOT) 8.6 mg tablet Take 1 tablet by mouth  2 (two) times a day as needed for constipation. 1 tablet BID as needed for constipation       simethicone (GAS-X) 80 MG chewable tablet Chew 1 tablet (80 mg total) 3 (three) times a day. 270 tablet 3     sodium chloride (OCEAN) 0.65 % nasal spray 1 spray into each nostril as needed for congestion. Install 2 sprays into both nostrils every 4 hours as needed for congestion       TAB-A-HU per tablet 1 TAB BY MOUTH DAILY (DX: SUPPLEMENT) 100 tablet 3     topiramate (TOPAMAX) 100 MG tablet 1 TAB BY MOUTH TWICE DAILY (DX:MOOD DISORDER) 60 tablet 10     traZODone (DESYREL) 150 MG tablet Take 200 mg by mouth bedtime. Per Dr. Crain        triamcinolone (KENALOG) 0.1 % cream Apply topically 3 (three) times a day as needed. 80 g 1     trospium (SANCTURA) 20 mg tablet TAKE 1 TABLET BY MOUTH TWICE A  tablet 3     vitamin E 400 UNIT capsule Take 400 Units by mouth daily. 90 capsule 3     Current Facility-Administered Medications   Medication Dose Route Frequency Provider Last Rate Last Dose     denosumab 60 mg (PROLIA 60 mg/ml)  60 mg Subcutaneous Q6 Months Eusebio Lerma DO   60 mg at 02/15/17 1420     lidocaine 2 % jelly (XYLOCAINE)   Topical PRN Boogie Cedeño MD   1 application at 05/30/17 1430       Total data points:  4.     Eusebio Lerma DO  Internal Medicine  [facility]

## 2021-06-11 NOTE — PROGRESS NOTES
[facility] Note    Michelle Moya   71 y.o. female    Date of Visit: 6/14/2017  Chief Complaint   Patient presents with     Follow-up       ASSESSMENT/PLAN  1. RUQ pain  HM2(CBC w/o Differential)    Hepatic Profile    US Abdomen Complete   2. Nonischemic cardiomyopathy  carvedilol (COREG) 6.25 MG tablet     ---------------------------------------------    1.  Right upper quadrant pain concerning for biliary colic.  Gallbladder apparently has been removed previously, check for choledocholithiasis with ultrasound, also evaluate for injury of the liver with her recent fall.  Also on the differential is gastritis/peptic ulcer disease.  If ultrasound, labs are normal, gastroenterology referral would be the next step.  --US shows irregular dilation of pancreatic duct which can be seen in chronic pancreatitis.  I am not sure this is the cause, but will refer to GI for additional evaluation    2.  Due to lower blood pressures, reduce carvedilol to 6.25 mg twice a day.  We have previously discontinued lisinopril due to low blood pressures and recurrent falls.      Together, we decided not to discontinue clonazepam altogether, and first let her visit with Dr. Crain in follow-up July 1.    Addendum: For purposes of face-to-face assessment, Michelle Moya has been dealing with decubitus ulcers due to her immobility, need to stay in her mechanical wheelchair for most of the day.  As part of the treatment for the ulcers, recommend pressure mapping for development of a custom cushion.      Return in about 1 month (around 7/14/2017), or if symptoms worsen or fail to improve.      SUBJECTIVE  Michelle Moya is a 71-year-old woman who presents for follow-up.    At the last visit due to recurrent falls, we stopped tamsulosin and the daytime clonazepam.  She is still on clonazepam 0.5 mg at night.  I sent letters to doctors James and Paras who were prescribing these medications.  Since the last visit, she has not had  "any falls.  Her legs feel steadier and stronger.    Recently, she has been having a \"low-grade temperature\" but unclear what the temperature actually was.  She has soreness over her right lower ribs after the aforementioned series of falls, and she tells me she dreads to eat, because she has right upper quadrant pain and some bloating feeling in the right upper quadrant after eating lasting 2 hours, rated 8-9/10.  She has noticed some dark urine as well.  She thought it was just rib pain from the fall.  She has not been able to eat as much as a result of the pain, has a generalized sick feeling after eating.  She has noticed a small spot of dark red blood in the bowel movements, but she thought that was because she had diarrhea recently.  She thinks she had her gallbladder out previously.  She does not think that the pain in her right upper quadrant is the same as the rib pain from the fall.    ROS A comprehensive review of systems was performed and was otherwise negative    Medications, allergies, and problem list were reviewed and updated    Patient Active Problem List   Diagnosis     Scoliosis     Rheumatoid arthritis of multiple sites with negative rheumatoid factor     Chronic Pain     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     Idiopathic Peripheral Neuropathy     Lumbar Spondylosis     Anxiety and depression     Heart Disease     Senile osteoporosis     Aortic regurgitation     ASD (atrial septal defect)     Distal radial fracture, right, closed, initial encounter     History of rheumatoid arthritis     Colostomy in place     History of hemorrhagic stroke with residual hemiparesis     OAB (overactive bladder)     Systemic lupus erythematosus     Osteoporosis     Obstructive sleep apnea syndrome     Hypothyroidism     Arthropathy of hand     Gastroesophageal reflux disease     Cerebrovascular disease     Cardiomyopathy     Chronic coronary artery disease     Sacral decubitus ulcer, stage II     " Vitamin B 12 deficiency     Multiple allergies     Chronic rhinitis     Essential hypertension     Recurrent falls while walking     Past Medical History:   Diagnosis Date     Alcohol abuse     HISTORY OF     Anemia      Arthritis     OA, RA     Cardiomyopathy      Cataract     bilateral     Chronic pain 2006    post cva neuralgia     Depression      Disease of thyroid gland     hypothyroid     GERD (gastroesophageal reflux disease)      Heart disease      Incontinence in female     interstim placement     Lumbar pain     spinal cord stimulator implant     Mitral valve disorder      Ostium secundum atrial septal defect 1998     PFO (patent foramen ovale) 1998     Sleep apnea     unable to use CPAP     Stroke 1998    hemorrhagic, 1998, 2003.residual imbalance.  hematoma right basal ganglia and left frontal lobe     Stroke     cognitive deficits     Past Surgical History:   Procedure Laterality Date     ANKLE SURGERY Left      APPENDECTOMY       CHOLECYSTECTOMY       COLOSTOMY      FOR CHRONIC FECAL INCONTINENCE     GASTRIC BYPASS      REVERSAL     GLAUCOMA SURGERY      laser     HERNIA REPAIR  2015    obstruction, colostomy     HYSTERECTOMY  1977     JOINT REPLACEMENT      hip, knee     ORIF DISTAL RADIUS FRACTURE Right 10/20/2015     GA ARTHRODESIS ANT INTERBODY MIN DISCECTOMY,LUMBAR  2013    Description: Lumbar Vertebral Fusion;  Recorded: 08/11/2011;     GA REVISE MEDIAN N/CARPAL TUNNEL SURG Right 10/20/2015    Procedure: RIGHT CARPAL TUNNEL RELEASE;  Surgeon: Luis A Perea MD;  Location: [facility];  Service: Hand     Social History     Social History     Marital status:      Spouse name: N/A     Number of children: N/A     Years of education: N/A     Occupational History     Not on file.     Social History Main Topics     Smoking status: Never Smoker     Smokeless tobacco: Not on file     Alcohol use No     Drug use: No     Sexual activity: No     Other Topics Concern     Not on file      Social History Narrative    ** Merged History Encounter **          Family History   Problem Relation Age of Onset     Rheumatic fever Mother      Heart disease Mother      Cancer Father      COPD Father      Stroke Father      Heart disease Maternal Grandmother      Heart disease Maternal Grandfather      Schizophrenia Daughter      Breast cancer Maternal Aunt      Breast cancer Maternal Aunt        Current Outpatient Prescriptions   Medication Sig Dispense Refill     acetaminophen (TYLENOL) 500 MG tablet Take 500-1,000 mg by mouth as needed. For headaches        albuterol (PROVENTIL HFA;VENTOLIN HFA) 90 mcg/actuation inhaler Inhale 2 puffs every 4 (four) hours as needed for wheezing.       aspirin 325 MG tablet Take 1 tablet (325 mg total) by mouth daily. 90 tablet 3     atorvastatin (LIPITOR) 10 MG tablet 1 TAB BY MOUTH DAILY (CHOLESTEROL) 30 tablet 11     calcium citrate (CALCITRATE) 200 mg (950 mg) tablet Take 2.5 tablets (500 mg total) by mouth 3 (three) times a day. 675 tablet 1     carvedilol (COREG) 6.25 MG tablet Take 1 tablet (6.25 mg total) by mouth 2 (two) times a day with meals. 180 tablet 3     cetirizine (ZYRTEC) 10 MG tablet Take 1 tablet (10 mg total) by mouth every morning. 90 tablet 3     cholecalciferol, vitamin D3, 1,000 unit tablet Take 3 tablets (3,000 Units total) by mouth daily. 270 tablet 3     clonazePAM (KLONOPIN) 0.5 MG tablet Take 0.5 mg by mouth bedtime.        conjugated estrogens (PREMARIN) vaginal cream Apply pea size amount on finger and apply to vaginal area 1-2x week. 42.5 g 11     cyanocobalamin (VITAMIN B-12) 1000 MCG tablet Take 1 tablet (1,000 mcg total) by mouth daily. 90 tablet 3     cycloSPORINE (RESTASIS) 0.05 % ophthalmic emulsion Administer 1 drop to both eyes 2 (two) times a day.       dicyclomine (BENTYL) 10 MG capsule Take 1 capsule (10 mg total) by mouth every morning. And two other doses as needed daily 90 capsule 3     EPINEPHrine (EPIPEN) 0.3 mg/0.3 mL  "(1:1,000) atIn Inject 0.3 mg into the shoulder, thigh, or buttocks once.       estradiol (ESTRACE) 0.01 % (0.1 mg/gram) vaginal cream Place pea size amount to finger and apply to vaginal area 1-2x per week 42.5 g 0     famotidine (PEPCID) 20 MG tablet TAKE 1 TABLET (20MG) BY MOUTH TWICE A DAY 60 tablet 9     ferrous sulfate 325 mg (65 mg iron) CpER Take 1 tablet by mouth every morning. 30 capsule 5     foam bandage 3 X 3 \" Bndg Apply 1 application topically daily. 30 each 1     furosemide (LASIX) 20 MG tablet Take 20 mg by mouth daily, and take second afternoon dose if weight is over 106 lbs. 90 tablet 3     hydrocolloid dressing (DUODERM CGF DRESSING) 4 X 4 \" Bndg Apply 1 patch topically 2 (two) times a week. 10 each 1     hydroxychloroquine (PLAQUENIL) 200 mg tablet TAKE 1 TABLET (200 MG TOTAL) BY MOUTH DAILY. (LUPUS) 30 tablet 10     ipratropium (ATROVENT) 0.06 % nasal spray 2 sprays into each nostril 3 (three) times a day. 15 mL 2     levomilnacipran (FETZIMA) 120 mg Cs24 ER capsule Take 1 capsule (120 mg total) by mouth daily. 90 capsule 3     levothyroxine (SYNTHROID, LEVOTHROID) 75 MCG tablet Take 1 tablet (75 mcg total) by mouth Daily at 6:00 am. 90 tablet 3     lipase-protease-amylase (CREON) 12,000-38,000 -60,000 unit CpDR capsule Take 1 capsule (12,000 units of lipase total) by mouth 3 (three) times a day with meals. 270 capsule 3     methylphenidate (RITALIN) 5 MG tablet Take 15 mg by mouth every morning. And 10mg at noon       morphine (MS CONTIN) 15 MG 12 hr tablet Take 1 tablet (15 mg total) by mouth every 12 (twelve) hours. 720 tablet 0     mupirocin (BACTROBAN) 2 % ointment Apply topically 3 (three) times a day.       nitroglycerin (NITROSTAT) 0.4 MG SL tablet Place 0.4 mg under the tongue every 5 (five) minutes as needed for chest pain.       OMEGA-3/DHA/EPA/FISH OIL (FISH OIL-OMEGA-3 FATTY ACIDS) 300-1,000 mg capsule Take 1 g by mouth daily. Indications: Hypertriglyceridemia       omeprazole " (PRILOSEC) 20 MG capsule 1 CAP BY MOUTH TWICE DAILY - TAKE 30 MINUTES BEFORE A MEAL - DO NOT CRUSH (DX: GERD) 60 capsule 11     ondansetron (ZOFRAN) 4 MG tablet Take 4 mg by mouth every 8 (eight) hours as needed for nausea.       polyethylene glycol (MIRALAX) 17 gram packet Take 1 packet (17 g total) by mouth every other day. 17 GM in 8 oz of water daily as needed 30 each 11     polyvinyl alcohol (ARTIFICIAL TEARS, POLYVIN ALC,) 1.4 % ophthalmic solution as needed.        potassium chloride SA (K-DUR,KLOR-CON) 20 MEQ tablet Take 1 tablet (20 mEq total) by mouth daily. 90 tablet 3     pseudoephedrine (SUDAFED) 30 MG tablet Take 30 mg by mouth every 6 (six) hours as needed for congestion. Take 1-2 tablets every 6 hours as needed for nasal congestion  Indications: Nasal Congestion       rOPINIRole (REQUIP) 0.5 MG tablet Take 1 tablet (0.5 mg total) by mouth 2 (two) times a day. 180 tablet 3     senna (SENOKOT) 8.6 mg tablet Take 1 tablet by mouth 2 (two) times a day as needed for constipation. 1 tablet BID as needed for constipation       simethicone (GAS-X) 80 MG chewable tablet Chew 1 tablet (80 mg total) 3 (three) times a day. 270 tablet 3     sodium chloride (OCEAN) 0.65 % nasal spray 1 spray into each nostril as needed for congestion. Install 2 sprays into both nostrils every 4 hours as needed for congestion       TAB-A-HU per tablet 1 TAB BY MOUTH DAILY (DX: SUPPLEMENT) 100 tablet 3     topiramate (TOPAMAX) 100 MG tablet 1 TAB BY MOUTH TWICE DAILY (DX:MOOD DISORDER) 60 tablet 10     traZODone (DESYREL) 150 MG tablet Take 200 mg by mouth bedtime. Per Dr. Crain        triamcinolone (KENALOG) 0.1 % cream Apply topically 3 (three) times a day as needed. 80 g 1     trospium (SANCTURA) 20 mg tablet TAKE 1 TABLET BY MOUTH TWICE A  tablet 3     vitamin E 400 UNIT capsule Take 400 Units by mouth daily. 90 capsule 3     Current Facility-Administered Medications   Medication Dose Route Frequency Provider Last Rate  Last Dose     denosumab 60 mg (PROLIA 60 mg/ml)  60 mg Subcutaneous Q6 Months Eusebio Galeas Lerma, DO   60 mg at 02/15/17 1420     lidocaine 2 % jelly (XYLOCAINE)   Topical PRN Boogie Cedeño MD   1 application at 05/30/17 1430       Allergies   Allergen Reactions     Iodine Anaphylaxis     Nickel Hives     Pineapple Anaphylaxis     Pineapple Anaphylaxis     Shellfish Containing Products Anaphylaxis     Amlodipine      swelling     Beta-Blockers (Beta-Adrenergic Blocking Agts) Other (See Comments)     Blood pressure drops     Gabapentin Other (See Comments)     Blood pressure drops     Latex Hives     Latex Unknown     Mushroom Comb No 1      Doesn't like     Oxycodone-Acetaminophen Nausea And Vomiting     Pregabalin Unknown     Tomato (Solanum Lycopersicum) Hives and Itching     Cooked tomatoes are ok. Raw are not.     Codeine Rash     Tomato Rash     Fresh tomatoes only-can eat cooked       EXAM  Vitals:    06/14/17 1328   BP: 100/50   Pulse: 80   Weight: 99 lb 8 oz (45.1 kg)     General: Alert, frail appearing, appears underweight, sitting in electric wheelchair  ENT: Sclera anicteric, moist oral mucosa  Lungs: Clear to auscultation bilaterally, no crackles or wheezes  Abdomen: Soft, right upper quadrant tenderness with deep palpation in the area of the liver/gallbladder fossa.  Musculoskeletal: Mild discomfort with palpation of the right anterior ribs, not the same as the pain she is describing  Skin: No rashes, decubitus ulcer was not examined today  Psychiatric: Pleasant affect, appears brighter today    RESULTS REVIEWED:     ANALYSIS AND SUMMARY OF OLD RECORDS, NOTES AND CONSULTS (2): Reviewed numerous home care visits from 5/31-6/12    RECORDS REQUESTED (1): None.     OTHER HISTORY SUMMARIZED (from nursing staff, family, friends) (2):     RADIOLOGY TESTS SUMMARIZED or REQUESTED (XRAY/CT/MRI/DXA) (1): Ordered abdominal ultrasound    MEDICINE TESTS SUMMARIZED or REQUESTED (EKG/ECHO/COLONOSCOPY/EGD) (1):  None.  Echocardiogram  Ejection fraction 50%  .    INDEPENDENT REVIEW OF EKG OR X-RAY (2): None.  Labs ordered    Data points  5       Eusebio Lerma DO  Internal Medicine  [facility]

## 2021-06-11 NOTE — PROGRESS NOTES
[facility] Note    Michelle Moya   71 y.o. female    Date of Visit: 7/19/2017  Chief Complaint   Patient presents with     Pre-op Exam     Questions on medications       ASSESSMENT/PLAN  1. Pre-operative examination for internal medicine  Electrocardiogram Perform and Read     ---------------------------------------------    Michelle is preparing for EGD with EUS, ERCP.  Main risk would be intrinsic to the procedure itself, but not anesthesia.  Given the low risk nature of endoscopy, I recommend proceeding with it without additional workup.  Exercise capacity is indeterminate.  She does have chronic pitting leg edema, but echocardiogram demonstrates normal ejection fraction.    --Advised to hold aspirin before the procedure and resume after.  --restart coreg 6.25 mg BID- I did not intend to stop this.    --lisinopril taken off the allergy list, which got on there for unclear reasons.  This was just discontinued due to low BP    Return in about 1 month (around 8/19/2017) for Recheck.      SUBJECTIVE  Michelle Moya is a 71 year old woman here for pre-op evaluation prior to EGD with EUS and probable ERCP.  She has weathered anesthesia well in the past with other surgeries without complication.  She denies any CP or SOB.  Exercise capacity is severely limited due to frailty and frequent falls.  She has lower extremity edema, but is chronic and not an indication of heart failure.  The GI workup arose from postprandial abdominal pain and abnormality of the pancreatic duct on ultrasound.  No history of MI, diabetes, kidney disease, or TIA.    She saw her ophthalmologist at [facility] and he recommended decreasing the HCQ due to some retinal changes.      ROS A comprehensive review of systems was performed and was otherwise negative    Medications, allergies, and problem list were reviewed and updated      Past Medical History:   Diagnosis Date     Alcohol abuse     HISTORY OF     Anemia      Arthritis      OA, RA     Cardiomyopathy      Cataract     bilateral     Chronic pain 2006    post cva neuralgia     Depression      Disease of thyroid gland     hypothyroid     GERD (gastroesophageal reflux disease)      Heart disease      Incontinence in female     interstim placement     Lumbar pain     spinal cord stimulator implant     Mitral valve disorder      Ostium secundum atrial septal defect 1998     PFO (patent foramen ovale) 1998     Sleep apnea     unable to use CPAP     Stroke 1998    hemorrhagic, 1998, 2003.residual imbalance.  hematoma right basal ganglia and left frontal lobe     Stroke     cognitive deficits     Past Surgical History:   Procedure Laterality Date     ANKLE SURGERY Left      APPENDECTOMY       CHOLECYSTECTOMY       COLOSTOMY      FOR CHRONIC FECAL INCONTINENCE     GASTRIC BYPASS      REVERSAL     GLAUCOMA SURGERY      laser     HERNIA REPAIR  2015    obstruction, colostomy     HYSTERECTOMY  1977     JOINT REPLACEMENT      hip, knee     ORIF DISTAL RADIUS FRACTURE Right 10/20/2015     MS ARTHRODESIS ANT INTERBODY MIN DISCECTOMY,LUMBAR  2013    Description: Lumbar Vertebral Fusion;  Recorded: 08/11/2011;     MS REVISE MEDIAN N/CARPAL TUNNEL SURG Right 10/20/2015    Procedure: RIGHT CARPAL TUNNEL RELEASE;  Surgeon: Luis A Perea MD;  Location: [facility];  Service: Hand     Social History     Social History     Marital status:      Spouse name: N/A     Number of children: N/A     Years of education: N/A     Occupational History     Not on file.     Social History Main Topics     Smoking status: Never Smoker     Smokeless tobacco: Not on file     Alcohol use No     Drug use: No     Sexual activity: No     Other Topics Concern     Not on file     Social History Narrative    ** Merged History Encounter **          Family History   Problem Relation Age of Onset     Rheumatic fever Mother      Heart disease Mother      Cancer Father      COPD Father      Stroke Father       "Heart disease Maternal Grandmother      Heart disease Maternal Grandfather      Schizophrenia Daughter      Breast cancer Maternal Aunt      Breast cancer Maternal Aunt        Current Outpatient Prescriptions   Medication Sig Dispense Refill     acetaminophen (TYLENOL) 500 MG tablet Take 500-1,000 mg by mouth as needed. For headaches        albuterol (PROVENTIL HFA;VENTOLIN HFA) 90 mcg/actuation inhaler Inhale 2 puffs every 4 (four) hours as needed for wheezing.       aspirin 325 MG tablet Take 1 tablet (325 mg total) by mouth daily. 90 tablet 3     atorvastatin (LIPITOR) 10 MG tablet 1 TAB BY MOUTH DAILY (CHOLESTEROL) 30 tablet 11     calcium citrate (CALCITRATE) 200 mg (950 mg) tablet Take 2.5 tablets (500 mg total) by mouth 3 (three) times a day. 675 tablet 1     cetirizine (ZYRTEC) 10 MG tablet Take 1 tablet (10 mg total) by mouth every morning. 90 tablet 3     clonazePAM (KLONOPIN) 0.5 MG tablet Take 0.5 mg by mouth bedtime.        cyanocobalamin (VITAMIN B-12) 1000 MCG tablet Take 1 tablet (1,000 mcg total) by mouth daily. 90 tablet 3     cycloSPORINE (RESTASIS) 0.05 % ophthalmic emulsion Administer 1 drop to both eyes 2 (two) times a day.       dicyclomine (BENTYL) 10 MG capsule Take 1 capsule (10 mg total) by mouth every morning. And two other doses as needed daily 90 capsule 3     EPINEPHrine (EPIPEN) 0.3 mg/0.3 mL (1:1,000) atIn Inject 0.3 mg into the shoulder, thigh, or buttocks once.       estradiol (ESTRACE) 0.01 % (0.1 mg/gram) vaginal cream Place pea size amount to finger and apply to vaginal area 1-2x per week 42.5 g 0     famotidine (PEPCID) 20 MG tablet TAKE 1 TABLET (20MG) BY MOUTH TWICE A DAY 60 tablet 9     ferrous sulfate 325 mg (65 mg iron) CpER Take 1 tablet by mouth every morning. 30 capsule 5     foam bandage 3 X 3 \" Bndg Apply 1 application topically daily. 30 each 1     furosemide (LASIX) 20 MG tablet Take 20 mg by mouth daily, and take second afternoon dose if weight is over 106 lbs. 90 " "tablet 3     hydrocolloid dressing (DUODERM CGF DRESSING) 4 X 4 \" Bndg Apply 1 patch topically 2 (two) times a week. 10 each 1     hydroxychloroquine (PLAQUENIL) 200 mg tablet TAKE 1 TABLET (200 MG TOTAL) BY MOUTH DAILY. (LUPUS) 30 tablet 10     ipratropium (ATROVENT) 0.06 % nasal spray 2 sprays into each nostril 3 (three) times a day. 15 mL 2     levomilnacipran (FETZIMA) 120 mg Cs24 ER capsule Take 1 capsule (120 mg total) by mouth daily. 90 capsule 3     levothyroxine (SYNTHROID, LEVOTHROID) 75 MCG tablet Take 1 tablet (75 mcg total) by mouth Daily at 6:00 am. 90 tablet 3     lipase-protease-amylase (CREON) 12,000-38,000 -60,000 unit CpDR capsule Take 1 capsule (12,000 units of lipase total) by mouth 3 (three) times a day with meals. 270 capsule 3     methylphenidate (RITALIN) 5 MG tablet Take 15 mg by mouth every morning. And 10mg at noon       morphine (MS CONTIN) 15 MG 12 hr tablet Take 1 tablet (15 mg total) by mouth every 12 (twelve) hours. 720 tablet 0     mupirocin (BACTROBAN) 2 % ointment Apply topically 3 (three) times a day.       nitroglycerin (NITROSTAT) 0.4 MG SL tablet Place 0.4 mg under the tongue every 5 (five) minutes as needed for chest pain.       OMEGA-3/DHA/EPA/FISH OIL (FISH OIL-OMEGA-3 FATTY ACIDS) 300-1,000 mg capsule Take 1 g by mouth daily. Indications: Hypertriglyceridemia       omeprazole (PRILOSEC) 20 MG capsule 1 CAP BY MOUTH TWICE DAILY - TAKE 30 MINUTES BEFORE A MEAL - DO NOT CRUSH (DX: GERD) 60 capsule 11     ondansetron (ZOFRAN) 4 MG tablet Take 4 mg by mouth every 8 (eight) hours as needed for nausea.       polyethylene glycol (MIRALAX) 17 gram packet Take 1 packet (17 g total) by mouth every other day. 17 GM in 8 oz of water daily as needed 30 each 11     polyvinyl alcohol (ARTIFICIAL TEARS, POLYVIN ALC,) 1.4 % ophthalmic solution as needed.        potassium chloride SA (K-DUR,KLOR-CON) 20 MEQ tablet Take 1 tablet (20 mEq total) by mouth daily. 90 tablet 3     pseudoephedrine " (SUDAFED) 30 MG tablet Take 30 mg by mouth every 6 (six) hours as needed for congestion. Take 1-2 tablets every 6 hours as needed for nasal congestion  Indications: Nasal Congestion       rOPINIRole (REQUIP) 0.5 MG tablet Take 1 tablet (0.5 mg total) by mouth 2 (two) times a day. 180 tablet 3     senna (SENOKOT) 8.6 mg tablet Take 1 tablet by mouth 2 (two) times a day as needed for constipation. 1 tablet BID as needed for constipation       simethicone (GAS-X) 80 MG chewable tablet Chew 1 tablet (80 mg total) 3 (three) times a day. 270 tablet 3     sodium chloride (OCEAN) 0.65 % nasal spray 1 spray into each nostril as needed for congestion. Install 2 sprays into both nostrils every 4 hours as needed for congestion       TAB-A-HU per tablet 1 TAB BY MOUTH DAILY (DX: SUPPLEMENT) 100 tablet 3     topiramate (TOPAMAX) 100 MG tablet 1 TAB BY MOUTH TWICE DAILY (DX:MOOD DISORDER) 60 tablet 10     traZODone (DESYREL) 150 MG tablet Take 200 mg by mouth bedtime. Per Dr. Crain        triamcinolone (KENALOG) 0.1 % cream Apply topically 3 (three) times a day as needed. 80 g 1     trospium (SANCTURA) 20 mg tablet TAKE 1 TABLET BY MOUTH TWICE A  tablet 3     vitamin E 400 UNIT capsule Take 400 Units by mouth daily. 90 capsule 3     carvedilol (COREG) 6.25 MG tablet Take 1 tablet (6.25 mg total) by mouth 2 (two) times a day with meals. 60 tablet 11     cholecalciferol, vitamin D3, 1,000 unit tablet Take 3 tablets (3,000 Units total) by mouth daily. 270 tablet 3     Current Facility-Administered Medications   Medication Dose Route Frequency Provider Last Rate Last Dose     denosumab 60 mg (PROLIA 60 mg/ml)  60 mg Subcutaneous Q6 Months Eusebio Lerma DO   60 mg at 02/15/17 1420     lidocaine 2 % jelly (XYLOCAINE)   Topical PRN Boogie Cedeño MD           Allergies   Allergen Reactions     Iodine Anaphylaxis     Nickel Hives     Pineapple Anaphylaxis     Pineapple Anaphylaxis     Shellfish Containing Products  Anaphylaxis     Amlodipine      swelling     Beta-Blockers (Beta-Adrenergic Blocking Agts) Other (See Comments)     Blood pressure drops     Gabapentin Other (See Comments)     Blood pressure drops     Latex Hives     Latex Unknown     Metoprolol Tartrate Unknown     Mushroom Comb No 1      Doesn't like     Nortriptyline Hcl Unknown     Oxycodone-Acetaminophen Nausea And Vomiting     Pregabalin Unknown     Tomato (Solanum Lycopersicum) Hives and Itching     Cooked tomatoes are ok. Raw are not.     Codeine Rash     Tomato Rash     Fresh tomatoes only-can eat cooked       EXAM  Vitals:    07/19/17 1518   BP: 100/56   Pulse: 88   Weight: 100 lb 4.8 oz (45.5 kg)         General: alert, no distress, frail, fatigued  HEENT: sclerae anicteric, moist oral mucosa  Heart: Regular rate and rhythm, no murmurs.  No pretibial edema.  Warm extremities  Lungs: Clear to auscultation bilat  Gastrointestinal: abdomen is soft, mild RUQ tenderness, non-distended.  Brown stool in ostomy bag  Skin: warm/dry, no rashes  Neuro: no gross abnormalities         RESULTS REVIEWED:     ANALYSIS AND SUMMARY OF OLD RECORDS, NOTES AND CONSULTS (2): rev [facility] consult from 7/13/17    RECORDS REQUESTED (1): None.     OTHER HISTORY SUMMARIZED (from nursing staff, family, friends) (2):     RADIOLOGY TESTS SUMMARIZED or REQUESTED (XRAY/CT/MRI/DXA) (1):   US abd reviewed    MEDICINE TESTS SUMMARIZED or REQUESTED (EKG/ECHO/COLONOSCOPY/EGD) (1):   11/4/16 ECHO   1. The left ventricle is normal in size. Left ventricular systolic   performance is at the lower limits of normal with a visually estimated   ejection fraction of 50%.   2. There is mild aortic insufficiency.   3. The left atrium is normal in size.       When compared to the prior real-time echocardiogram dated 13 April 2016,   there has been an interval improvement in left ventricular systolic    INDEPENDENT REVIEW OF EKG OR X-RAY (2): EKG- NSR  .  LFT's from [facility] reviewed as well as lipase    Data  points  6     Eusebio Lerma, DO  Internal Medicine  [facility]

## 2021-06-11 NOTE — PROGRESS NOTES
Plastic Surgery Assessment Note    Today's Visit:  Pt is doing well and has no concerns or complaints.  Pt is tolerating the dressings well.  Pt has no fever or chills.      Labs:  No results for input(s): CRP, HGBA1C, LABPRE, ALBUMIN in the last 72 hours.    Invalid input(s): CDIFF, HEM    Vitals:  Vitals:    06/27/17 1252   BP: 120/64   Pulse: 88   Resp: 16   Temp: (!) 100.8  F (38.2  C)       Francisco:           There is no height or weight on file to calculate BMI.    Output:                       Physical Exam:  General Appearance:  Appears comfortable, Alert, cooperative, no distress,   Head: Normocephalic, without obvious abnormality, atraumatic  Eyes: PERRL, conjunctiva/corneas clear, EOM's intact, both eyes   Nose: Nares normal, no drainage   Throat: Lips, mucosa, and tongue normal; teeth and gums normal  Neck: Supple, symmetrical, trachea midline, no adenopathy;    Lungs: Clear to auscultation bilaterally, respirations unlabored  Chest Wall: No tenderness or deformity  Heart: Regular rate and rhythm, S1 and S2 normal, no murmur, rubs or gallop  Abdomen: Soft, non-tender, bowel sounds active all four quadrants,   no masses, no organomegaly  Extremities: Extremities normal, atraumatic, no cyanosis or edema  Pulses: DP pulses are 1-2+ bilat.    Skin: no rashes or lesions  Neurologic: normal and equal strength bilat in upper and lower extremities    Wound Ostomy Assessment/Plan:  Wound 11/22/16 Coccyx (Active)   Pre Size Length 0.6 6/27/2017 12:00 PM   Pre Size Width 0.6 6/27/2017 12:00 PM   Pre Total Sq cm 0.36 6/27/2017 12:00 PM   Description pink 5/9/2017 12:00 PM   Prodcut Used Foam 6/27/2017 12:00 PM       Wound 03/14/17 right sacral (Active)   Pre Size Length 2 6/27/2017 12:00 PM   Pre Size Width 2 6/27/2017 12:00 PM   Pre Size Depth 2 6/27/2017 12:00 PM   Pre Total Sq cm 4 6/27/2017 12:00 PM   Description adherent brownish colored slough 5/9/2017 12:00 PM   Prodcut Used Foam;Santyl 6/27/2017 12:00 PM        Incision 10/20/15 Arm Right (Active)     There is stage 4 coccyx pressure ulcer. The dressings were all removed. The coccyx still has some necrotic sq at the center of the base of the ulcer. It is very tender to palpation and there is slight erythema, drainage but no pus. Using a currette, incisional debridement of the coccyx was done. Debrided 2x2 cm of necrotic sq to healthy bleeding tissue. Hemostasis was well obtained. She tolerated it well and no complications were noted. Dresssings were reapplied. I recommend VAC.  Pt states that she is unsteady on her feet and I told her that if is feels unbalanced that she should not walk without help.  Continue to offload and increase nutritional status.  F/u 1 month.         Boogie Cedeño MD

## 2021-06-12 NOTE — PROGRESS NOTES
Plastic Surgery Assessment Note    Today's Visit:  Pt is doing well and has no concerns or complaints.  Pt is tolerating the dressings well.  Pt has no fever or chills.      Labs:  No results for input(s): CRP, HGBA1C, LABPRE, ALBUMIN in the last 72 hours.    Invalid input(s): CDIFF, HEM    Vitals:  Vitals:    07/25/17 1358   BP: 90/54   Pulse: 76   Resp: 16   Temp: 99  F (37.2  C)       Francisco:           There is no height or weight on file to calculate BMI.    Output:                       Physical Exam:  General Appearance:  Appears comfortable, Alert, cooperative, no distress,   Head: Normocephalic, without obvious abnormality, atraumatic  Eyes: PERRL, conjunctiva/corneas clear, EOM's intact, both eyes   Nose: Nares normal, no drainage   Throat: Lips, mucosa, and tongue normal; teeth and gums normal  Neck: Supple, symmetrical, trachea midline, no adenopathy;    Lungs: Clear to auscultation bilaterally, respirations unlabored  Chest Wall: No tenderness or deformity  Heart: Regular rate and rhythm, S1 and S2 normal, no murmur, rubs or gallop  Abdomen: Soft, non-tender, bowel sounds active all four quadrants,   no masses, no organomegaly  Extremities: Extremities normal, atraumatic, no cyanosis or edema  Pulses: DP pulses are 1-2+ bilat.    Skin: no rashes or lesions  Neurologic: normal and equal strength bilat in upper and lower extremities    Wound Ostomy Assessment/Plan:  Wound 03/14/17 right sacral (Active)   Pre Size Length 1.2 7/25/2017  2:00 PM   Pre Size Width 1.2 7/25/2017  2:00 PM   Pre Size Depth 1.8 7/25/2017  2:00 PM   Pre Total Sq cm 4 6/27/2017 12:00 PM   Undermined 12-12 o'clock at 3cm 7/25/2017  2:00 PM   Description adherent brownish colored slough 5/9/2017 12:00 PM   Prodcut Used Negative pressure 7/25/2017  2:00 PM         There is stage 4 coccyx pressure ulcer. The dressings were all removed. The coccyx still has some necrotic sq at the center of the base of the ulcer. It is very tender to  palpation and there is slight erythema, drainage but no pus. Using a currette, incisional debridement of the coccyx was done. Debrided 1x2 cm of necrotic sq to healthy bleeding tissue. Hemostasis was well obtained. She tolerated it well and no complications were noted. Dresssings were reapplied. Pt states that she is unsteady on her feet and I told her that if is feels unbalanced that she should not walk without help.  Continue to offload and increase nutritional status.  She was not approved for VAC so I recommend continue with silvercell dressing qd.  I recommend debridement and flap surgery.  Risk and benefits were explained and she agrees to proceed.  I will get her scheduled for the next 1.5 months.  F/u 1 month.       Boogie Cedeño MD

## 2021-06-12 NOTE — ANESTHESIA PREPROCEDURE EVALUATION
Anesthesia Evaluation      Patient summary reviewed   No history of anesthetic complications     Airway   Mallampati: II  Neck ROM: full   Pulmonary - normal exam   (+) sleep apnea on no CPAP, ,                          Cardiovascular - normal exam  (+) hypertension, valvular problems/murmurs (PFO), past MI, CAD, ,     ECG reviewed     ROS comment: Echo 11-4-16:       Summary   1. The left ventricle is normal in size. Left ventricular systolic   performance is at the lower limits of normal with a visually estimated   ejection fraction of 50%.   2. There is mild aortic insufficiency.   3. The left atrium is normal in size.       When compared to the prior real-time echocardiogram dated 13 April 2016,   there has been an interval improvement in left ventricular systolic   performance.     Neuro/Psych    (+) neuromuscular disease (Idiopathic peripheral neuropathy),  CVA (Cognitive deficits. Left hemiparesis. Bowel and bladder incontinence. s/p colostomy.) , depression, anxiety/panic attacks, chronic pain (Chronic back pain with spinal cord stimulator. Post-CVA neuralgia.)  (-) no seizures    Comments: RSD left lower limb  Mostly wheelchair dependent    Endo/Other    (+) hypothyroidism, arthritis (RA),   (-) no diabetes     Comments: Anemia - Hgb 10.1  Scoliosis  SLE    GI/Hepatic/Renal    (+) GERD,     (-) impaired hepatic function, renal disease    Comments: Hx Alcohol Abuse  S/p gastric bypass and reversal     Other findings: Past Medical History:  Diagnosis Date    Alcohol abuse      HISTORY OF    Anemia      Arthritis      OA, RA    Cardiomyopathy      Cataract      bilateral    Chronic pain 2006    post cva neuralgia    Depression      Disease of thyroid gland      hypothyroid    GERD (gastroesophageal reflux disease)      Heart disease      Incontinence in female      interstim placement    Lumbar pain      spinal cord stimulator implant    Mitral valve disorder      Ostium secundum atrial septal  defect 1998    PFO (patent foramen ovale) 1998    Sleep apnea      unable to use CPAP    Stroke 1998    hemorrhagic, 1998, 2003.residual imbalance. hematoma right basal ganglia and left frontal lobe    Stroke      cognitive deficits     Past Surgical History:  Procedure Laterality Date    ANKLE SURGERY Left      APPENDECTOMY        CHOLECYSTECTOMY        COLOSTOMY        FOR CHRONIC FECAL INCONTINENCE    GASTRIC BYPASS        REVERSAL          Dental    (+) lower dentures and upper dentures                       Anesthesia Plan  Planned anesthetic: general endotracheal  Prevent air bubbles in IV  Sugammadex reversal  Decadron 10 mg IV    ASA 4   Induction: intravenous   Anesthetic plan and risks discussed with: patient and child/children  Anesthesia plan special considerations: rapid sequence induction, antiemetics,   Post-op plan: routine recovery

## 2021-06-12 NOTE — ANESTHESIA POSTPROCEDURE EVALUATION
Patient: Michelle Moya  Endoscopic Ultrasound, Fine Needle Aspiration, Pancreatic Head Biopsy  Anesthesia type: general    Patient location: PACU  Last vitals:   Vitals:    07/21/17 1600   BP: 128/60   Pulse: 86   Resp:    Temp:    SpO2: 98%     Post vital signs: stable  Level of consciousness: awake and responds to simple questions  Post-anesthesia pain: pain controlled  Post-anesthesia nausea and vomiting: no  Pulmonary: unassisted, return to baseline  Cardiovascular: stable and blood pressure at baseline  Hydration: adequate  Anesthetic events: no    QCDR Measures:  ASA# 11 - Charlene-op Cardiac Arrest: ASA11B - Patient did NOT experience unanticipated cardiac arrest  ASA# 12 - Charlene-op Mortality Rate: ASA12B - Patient did NOT die  ASA# 13 - PACU Re-Intubation Rate: ASA13B - Patient did NOT require a new airway mgmt  ASA# 10 - Composite Anes Safety: ASA10A - No serious adverse event  ASA# 38 - New Corneal Injury: ASA38A - No new exposure keratitis or corneal abrasion in PACU    Additional Notes:

## 2021-06-12 NOTE — PROGRESS NOTES
[facility] Note    Michelle Moya   71 y.o. female    Date of Visit: 7/31/2017  Chief Complaint   Patient presents with     Follow-up     Emergency room       ASSESSMENT/PLAN  1. Falls frequently     2. Cardiomyopathy  Comprehensive Metabolic Panel   3. Pancreatic adenocarcinoma     4. Edema due to malnutrition       ---------------------------------------------    1.  Michelle mc has been falling frequently.  She has very limited mobility, and the time in her wheelchair combined with malnutrition lens to the problem of decubitus ulcers.  I agree that nursing home would be appropriate.  I will call her son Santosh.  It sounds that she will likely need surgery for debridement, and I would anticipate a prolonged stay at transitional care, at the least.  We will discontinue carvedilol since blood pressure typically is lower and there may be some association with falls.  I do not think this will solve her problem of falling, however.    2.  See above, ejection fraction is 50% when last checked    3.  Unfortunately, was diagnosed with pancreatic cancer, I will help facilitate consultation    4.  The peripheral edema did not respond to Lasix.  This might be from hypoalbuminemia, lymphedema.  I will call the home care nurse, see if we can do leg wraps instead of the compression garments.  Check albumin as suggested by homecare nurse.  Her malnutrition may be tied to #3.    Return in about 2 weeks (around 8/14/2017).      SUBJECTIVE  Michelle Moya is a 71-year-old woman who presents for follow-up.  She was seen in the ER last week because of a fall.  She has had multiple falls since our last visit.  Although, she felt like her symptoms got worse after stopping Coreg, they seem to get even worse after starting it again.  We reviewed that she has preserved ejection fraction, would be better to keep her blood pressure higher than lower (she was running down around 100 systolic before).  She fell and  struck her forehead, requiring 3 simple mattress sutures which are due to be removed today.  She wonders if it is time for her to go to a nursing home.    In other news, she saw her surgeon who is recommending debridement, which sounds quite extensive.    She was also diagnosed with pancreatic adenocarcinoma based on cytology brushings during recent evaluation by gastroenterology.  She is waiting to have an appointment with Dr. Simon, but has not heard back yet.    She would like adult diapers because she is having a hard time getting to the bathroom in time since taking furosemide twice a day.  This has not helped the lower extremity edema at all.  The compression stockings seem to do more harm than good.  She is lying in bed with her legs elevated, which hurts her back.  She has history of compression fractures from osteoporosis.    ROS A comprehensive review of systems was performed and was otherwise negative    Medications, allergies, and problem list were reviewed and updated    Patient Active Problem List   Diagnosis     Scoliosis     Rheumatoid arthritis of multiple sites with negative rheumatoid factor     Chronic Pain     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     Idiopathic Peripheral Neuropathy     Lumbar Spondylosis     Anxiety and depression     Heart Disease     Senile osteoporosis     Aortic regurgitation     ASD (atrial septal defect)     Distal radial fracture, right, closed, initial encounter     History of rheumatoid arthritis     Colostomy in place     History of hemorrhagic stroke with residual hemiparesis     OAB (overactive bladder)     Systemic lupus erythematosus     Osteoporosis     Obstructive sleep apnea syndrome     Hypothyroidism     Arthropathy of hand     Gastroesophageal reflux disease     Cerebrovascular disease     Cardiomyopathy     Chronic coronary artery disease     Sacral decubitus ulcer, stage II     Vitamin B 12 deficiency     Multiple allergies     Chronic  rhinitis     Essential hypertension     Pancreatic adenocarcinoma     Edema due to malnutrition     Falls frequently       Past Surgical History:   Procedure Laterality Date     ABDOMINAL SURGERY       ANKLE SURGERY Left      APPENDECTOMY       CHOLECYSTECTOMY       COLON SURGERY       COLOSTOMY      FOR CHRONIC FECAL INCONTINENCE     EYE SURGERY       GASTRIC BYPASS      REVERSAL     GLAUCOMA SURGERY      laser     HERNIA REPAIR  2015    obstruction, colostomy     HYSTERECTOMY  1977     JOINT REPLACEMENT Left     ERASMO     JOINT REPLACEMENT Left     TKA     ORIF DISTAL RADIUS FRACTURE Right 10/20/2015     MI ARTHRODESIS ANT INTERBODY MIN DISCECTOMY,LUMBAR  2013    Description: Lumbar Vertebral Fusion;  Recorded: 08/11/2011;     MI EDG US EXAM SURGICAL ALTER STOM DUODENUM/JEJUNUM N/A 7/21/2017    Procedure: Endoscopic Ultrasound, Fine Needle Aspiration, Pancreatic Head Biopsy;  Surgeon: Lewis Simon MD;  Location: [facility] [place];  Service: Gastroenterology     MI REVISE MEDIAN N/CARPAL TUNNEL SURG Right 10/20/2015    Procedure: RIGHT CARPAL TUNNEL RELEASE;  Surgeon: Luis A Perea MD;  Location: [facility];  Service: Hand     Social History     Social History     Marital status:      Spouse name: N/A     Number of children: N/A     Years of education: N/A     Occupational History     Not on file.     Social History Main Topics     Smoking status: Never Smoker     Smokeless tobacco: Never Used     Alcohol use No     Drug use: No     Sexual activity: No     Other Topics Concern     Not on file     Social History Narrative    ** Merged History Encounter **          Family History   Problem Relation Age of Onset     Rheumatic fever Mother      Heart disease Mother      Cancer Father      COPD Father      Stroke Father      Heart disease Maternal Grandmother      Heart disease Maternal Grandfather      Schizophrenia Daughter      Breast cancer Maternal Aunt      Breast cancer Maternal Aunt   "      Current Outpatient Prescriptions   Medication Sig Dispense Refill     acetaminophen (TYLENOL) 500 MG tablet Take 500-1,000 mg by mouth as needed. For headaches        albuterol (PROVENTIL HFA;VENTOLIN HFA) 90 mcg/actuation inhaler Inhale 2 puffs every 4 (four) hours as needed for wheezing.       aspirin 325 MG tablet Take 1 tablet (325 mg total) by mouth daily. 90 tablet 3     atorvastatin (LIPITOR) 10 MG tablet 1 TAB BY MOUTH DAILY (CHOLESTEROL) 30 tablet 11     calcium citrate (CALCITRATE) 200 mg (950 mg) tablet Take 2.5 tablets (500 mg total) by mouth 3 (three) times a day. 675 tablet 1     cetirizine (ZYRTEC) 10 MG tablet Take 1 tablet (10 mg total) by mouth every morning. 90 tablet 3     cholecalciferol, vitamin D3, 1,000 unit tablet Take 3 tablets (3,000 Units total) by mouth daily. 270 tablet 3     clonazePAM (KLONOPIN) 0.5 MG tablet Take 0.5 mg by mouth bedtime.        cyanocobalamin (VITAMIN B-12) 1000 MCG tablet Take 1 tablet (1,000 mcg total) by mouth daily. 90 tablet 3     cycloSPORINE (RESTASIS) 0.05 % ophthalmic emulsion Administer 1 drop to both eyes 2 (two) times a day.       dicyclomine (BENTYL) 10 MG capsule Take 1 capsule (10 mg total) by mouth every morning. And two other doses as needed daily 90 capsule 3     EPINEPHrine (EPIPEN) 0.3 mg/0.3 mL (1:1,000) atIn Inject 0.3 mg into the shoulder, thigh, or buttocks once.       estradiol (ESTRACE) 0.01 % (0.1 mg/gram) vaginal cream Place pea size amount to finger and apply to vaginal area 1-2x per week 42.5 g 0     famotidine (PEPCID) 20 MG tablet TAKE 1 TABLET (20MG) BY MOUTH TWICE A DAY 60 tablet 9     ferrous sulfate 325 mg (65 mg iron) CpER Take 1 tablet by mouth every morning. 30 capsule 5     foam bandage 3 X 3 \" Bndg Apply 1 application topically daily. 30 each 1     furosemide (LASIX) 20 MG tablet Take 20 mg by mouth daily, and take second afternoon dose if weight is over 106 lbs. 90 tablet 3     hydrocolloid dressing (DUODERM CGF " "DRESSING) 4 X 4 \" Bndg Apply 1 patch topically 2 (two) times a week. 10 each 1     hydroxychloroquine (PLAQUENIL) 200 mg tablet TAKE 1 TABLET (200 MG TOTAL) BY MOUTH DAILY. (LUPUS) 30 tablet 10     ipratropium (ATROVENT) 0.06 % nasal spray 2 sprays into each nostril 3 (three) times a day. 15 mL 2     levomilnacipran (FETZIMA) 120 mg Cs24 ER capsule Take 1 capsule (120 mg total) by mouth daily. 90 capsule 3     levothyroxine (SYNTHROID, LEVOTHROID) 75 MCG tablet Take 1 tablet (75 mcg total) by mouth Daily at 6:00 am. 90 tablet 3     lipase-protease-amylase (CREON) 12,000-38,000 -60,000 unit CpDR capsule Take 1 capsule (12,000 units of lipase total) by mouth 3 (three) times a day with meals. 270 capsule 3     methylphenidate (RITALIN) 5 MG tablet Take 15 mg by mouth every morning. And 10mg at noon       morphine (MS CONTIN) 15 MG 12 hr tablet Take 1 tablet (15 mg total) by mouth every 12 (twelve) hours. 720 tablet 0     mupirocin (BACTROBAN) 2 % ointment Apply topically 3 (three) times a day.       nitroglycerin (NITROSTAT) 0.4 MG SL tablet Place 0.4 mg under the tongue every 5 (five) minutes as needed for chest pain.       OMEGA-3/DHA/EPA/FISH OIL (FISH OIL-OMEGA-3 FATTY ACIDS) 300-1,000 mg capsule Take 1 g by mouth daily. Indications: Hypertriglyceridemia       omeprazole (PRILOSEC) 20 MG capsule 1 CAP BY MOUTH TWICE DAILY - TAKE 30 MINUTES BEFORE A MEAL - DO NOT CRUSH (DX: GERD) 60 capsule 11     ondansetron (ZOFRAN) 4 MG tablet Take 4 mg by mouth every 8 (eight) hours as needed for nausea.       polyethylene glycol (MIRALAX) 17 gram packet Take 1 packet (17 g total) by mouth every other day. 17 GM in 8 oz of water daily as needed 30 each 11     polyvinyl alcohol (ARTIFICIAL TEARS, POLYVIN ALC,) 1.4 % ophthalmic solution as needed.        potassium chloride SA (K-DUR,KLOR-CON) 20 MEQ tablet Take 1 tablet (20 mEq total) by mouth daily. 90 tablet 3     pseudoephedrine (SUDAFED) 30 MG tablet Take 30 mg by mouth every " 6 (six) hours as needed for congestion. Take 1-2 tablets every 6 hours as needed for nasal congestion  Indications: Nasal Congestion       rOPINIRole (REQUIP) 0.5 MG tablet Take 1 tablet (0.5 mg total) by mouth 2 (two) times a day. 180 tablet 3     senna (SENOKOT) 8.6 mg tablet Take 1 tablet by mouth 2 (two) times a day as needed for constipation. 1 tablet BID as needed for constipation       simethicone (GAS-X) 80 MG chewable tablet Chew 1 tablet (80 mg total) 3 (three) times a day. 270 tablet 3     sodium chloride (OCEAN) 0.65 % nasal spray 1 spray into each nostril as needed for congestion. Install 2 sprays into both nostrils every 4 hours as needed for congestion       TAB-A-HU per tablet 1 TAB BY MOUTH DAILY (DX: SUPPLEMENT) 100 tablet 3     topiramate (TOPAMAX) 100 MG tablet 1 TAB BY MOUTH TWICE DAILY (DX:MOOD DISORDER) 60 tablet 10     traZODone (DESYREL) 150 MG tablet Take 200 mg by mouth bedtime. Per Dr. Crain        triamcinolone (KENALOG) 0.1 % cream Apply topically 3 (three) times a day as needed. 80 g 1     trospium (SANCTURA) 20 mg tablet TAKE 1 TABLET BY MOUTH TWICE A  tablet 3     vitamin E 400 UNIT capsule Take 400 Units by mouth daily. 90 capsule 3     Current Facility-Administered Medications   Medication Dose Route Frequency Provider Last Rate Last Dose     denosumab 60 mg (PROLIA 60 mg/ml)  60 mg Subcutaneous Q6 Months Eusebio Lerma,    60 mg at 02/15/17 1420     lidocaine 2 % jelly (XYLOCAINE)   Topical PRN Boogie Cedeño MD   1 application at 07/25/17 1406       Allergies   Allergen Reactions     Iodine Anaphylaxis     Nickel Hives     Pineapple Anaphylaxis     Shellfish Containing Products Anaphylaxis     Amlodipine Other (See Comments)     swelling     Beta-Blockers (Beta-Adrenergic Blocking Agts) Other (See Comments)     Blood pressure drops     Gabapentin Other (See Comments)     Blood pressure drops     Latex Hives     Metoprolol Tartrate Unknown     Mushroom Comb No 1       Doesn't like     Nortriptyline Hcl Unknown     Oxycodone-Acetaminophen Nausea And Vomiting     Pregabalin Unknown     Tomato (Solanum Lycopersicum) Hives and Itching     Cooked tomatoes are ok. Raw are not.     Codeine Rash       EXAM  Vitals:    07/31/17 1032   BP: 130/60   Pulse: 80   SpO2: 94%   Weight: 99 lb 14.4 oz (45.3 kg)     General: Alert, frail-appearing  HEENT: Sclera anicteric, oral mucosa moist  Skin: 3 simple interrupted sutures removed from the right forehead near the hairline after prepping with rubbing alcohol.  This was tolerated well.  Skin is formed scab over laceration  Lymphatics: 2+ pitting edema around the middle of both calves  Psychiatric: Depressed affect  Neurologic: No gross abnormalities, gait not tested today  Lungs: Nonlabored breathing    RESULTS REVIEWED:     ANALYSIS AND SUMMARY OF OLD RECORDS, NOTES AND CONSULTS (2): Reviewed emergency room note from 7/26     RECORDS REQUESTED (1): None.     OTHER HISTORY SUMMARIZED (from nursing staff, family, friends) (2):     RADIOLOGY TESTS SUMMARIZED or REQUESTED (XRAY/CT/MRI/DXA) (1):   Ct Head Without Contrast    Result Date: 7/26/2017  [facility] CT HEAD WO CONTRAST 7/26/2017 4:08 PM INDICATION: Head injury mild or moderate acute, no neurological deficit TECHNIQUE: Routine. Dose reduction techniques were used. CONTRAST: None. COMPARISON:  5/27/2017 head CT. 4/4/2012 MRI head. FINDINGS: No intracranial hemorrhage, extraaxial collection, mass effect or CT evidence of acute infarct.  Stable chronic lacunar type infarct involving the right striatocapsular region. Normal parenchymal density for age. The ventricles and sulci are normal for age. Osseous structures are intact. The visualized orbits, paranasal sinuses and mastoid air cells are free of significant disease.     CONCLUSION: 1.  No CT finding of mass, infarct or hemorrhage. 2.  Stable age-related changes and chronic infarct involving the right striatocapsular region.      Ct Cervical Spine Without Contrast    Result Date: 7/26/2017  [facility] CT CERVICAL SPINE WO CONTRAST 7/26/2017 4:10 PM INDICATION: Status post fall, neck pain. TECHNIQUE: Helical thin-section CT scan of the spine was performed using bone reconstruction algorithm. From the axial source data, sagittal and coronal thin reformats. Dose reduction techniques were used. IV CONTRAST: None. COMPARISON: Cervical spine CT examination performed 5/27/2017. 9/10/2012 cervical spine MRI. FINDINGS: There is no acute fracture or subluxation of the cervical spine. Stable alignment in the setting of C5-C6 anterior cervical discectomy and fusion, with broad dextroconvex curvature. Vertebral body height is relatively preserved.  No significant spinal canal stenosis or neural foraminal compromise. The paraspinal soft tissues appear unremarkable. Chronic patulous appearance of the cervical esophagus.     CONCLUSION: 1. No acute traumatic fracture or subluxation of the cervical spine.       MEDICINE TESTS SUMMARIZED or REQUESTED (EKG/ECHO/COLONOSCOPY/EGD) (1): None    INDEPENDENT REVIEW OF EKG OR X-RAY (2): None.    Reviewed medical cytology report of pancreatic adenocarcinoma    Data points  4     Eusebio Lerma DO  Internal Medicine  [facility]

## 2021-06-12 NOTE — ANESTHESIA CARE TRANSFER NOTE
Last vitals:   Vitals:    07/21/17 1500   BP: 130/63   Pulse: 84   Resp: 10   Temp: 36.6  C (97.8  F)   SpO2: 97%     Patient's level of consciousness is drowsy  Spontaneous respirations: yes  Maintains airway independently: yes  Dentition unchanged: yes  Oropharynx: oropharynx clear of all foreign objects    QCDR Measures:  ASA# 20 - Surgical Safety Checklist: ASA20A - Safety Checks Done  PQRS# 430 - Adult PONV Prevention: 4558F - Pt received => 2 anti-emetic agents (different classes) preop & intraop  ASA# 8 - Peds PONV Prevention: NA - Not pediatric patient, not GA or 2 or more risk factors NOT present  PQRS# 424 - Charlene-op Temp Management: 4559F - At least one body temp DOCUMENTED => 35.5C or 95.9F within required timeframe  PQRS# 426 - PACU Transfer Protocol: - Transfer of care checklist used  ASA# 14 - Acute Post-op Pain: ASA14B - Patient did NOT experience pain >= 7 out of 10

## 2021-06-12 NOTE — PROGRESS NOTES
Plastic Surgery Assessment Note    Today's Visit:  Pt is doing well and has no concerns or complaints.  Pt is tolerating the dressings well.  Pt has no fever or chills.      Labs:  No results for input(s): CRP, HGBA1C, LABPRE, ALBUMIN in the last 72 hours.    Invalid input(s): CDIFF, HEM    Vitals:  Vitals:    08/22/17 1359   BP: 90/62   Pulse: 76   Resp: 14   Temp: 99  F (37.2  C)       Francisco:           There is no height or weight on file to calculate BMI.    Output:                       Physical Exam:  General Appearance:  Appears comfortable, Alert, cooperative, no distress,   Head: Normocephalic, without obvious abnormality, atraumatic  Eyes: PERRL, conjunctiva/corneas clear, EOM's intact, both eyes   Nose: Nares normal, no drainage   Throat: Lips, mucosa, and tongue normal; teeth and gums normal  Neck: Supple, symmetrical, trachea midline, no adenopathy;    Lungs: Clear to auscultation bilaterally, respirations unlabored  Chest Wall: No tenderness or deformity  Heart: Regular rate and rhythm, S1 and S2 normal, no murmur, rubs or gallop  Abdomen: Soft, non-tender, bowel sounds active all four quadrants,   no masses, no organomegaly  Extremities: Extremities normal, atraumatic, no cyanosis or edema  Pulses: DP pulses are 1-2+ bilat.    Skin: no rashes or lesions  Neurologic: normal and equal strength bilat in upper and lower extremities    Wound Ostomy Assessment/Plan:  Wound 03/14/17 right sacral (Active)   Pre Size Length 1.2 8/22/2017  2:00 PM   Pre Size Width 1.2 7/25/2017  2:00 PM   Pre Size Depth 1.8 8/22/2017  2:00 PM   Pre Total Sq cm 4 6/27/2017 12:00 PM   Undermined 12-12 o'clock at 3cm 7/25/2017  2:00 PM   Description adherent brownish colored slough 5/9/2017 12:00 PM   Prodcut Used Negative pressure 7/25/2017  2:00 PM       Incision 10/20/15 Arm Right (Active)       There is stage 4 coccyx pressure ulcer. The dressings were all removed. The coccyx still has some necrotic sq at the center of the  base of the ulcer. It is very tender to palpation and there is slight erythema, drainage but no pus. Using a currette, incisional debridement of the coccyx was done. Debrided 1x2 cm of necrotic sq to healthy bleeding tissue. Hemostasis was well obtained. She tolerated it well and no complications were noted. Dresssings were reapplied.  Continue to offload and increase nutritional status. She was recently diagnosed with inoperable pancreatic cancer.  I had scheduled her for flap surgery but will cancel her surgery.  She will not heal the flap surgery.  Continue packs for now.    Boogie Cedeño MD

## 2021-06-13 NOTE — PROGRESS NOTES
[facility]    Name:   Michelle Moya  : 1945  Facility:   [facility] [[id number]]   Room: 229  Code Status: DNR/DNI - [facility]  Fac type:   NF (Long Term Care, LTC) -     CHIEF COMPLAINT / REASON FOR VISIT:  Chief Complaint   Patient presents with     Review Of Multiple Medical Conditions     Follow-up to new admission with particular attention paid to palliative care/severe pain.    Patient was last seen by Dr. Mckeon for an admission visit on 17.    HPI: Michelle is a 72 y.o. female on hospice with metastatic adenocarcinoma of the pancreas and chronic pain issues.  Her most recent hospitalization was from 17 until 17.  She was initially brought to the hospital for evaluation of acute encephalopathy, but a CT scan of the head done as part of her emergency room evaluation showed no evidence for hemorrhage or acute abnormalities.  A CT of the abdomen and pelvis without contrast was attempted, and there was no evidence for bowel obstruction.  The study was limited due to artifact and lack of contrast.  Lab studies were particularly notable for an albumin of 1.6 and total protein of 4.5, consistent with severe protein calorie malnutrition.  Following a palliative care consult, she was discharged here with hospice care.  She had been on an intrathecal pump for pain prior to admission.  The dose of the pump was diminished dramatically and, prior to discharge, she was changed to hydromorphone as needed for pain due to poor tolerance of oral morphine.  She also has lorazepam ordered as needed for anxiety with ondansetron for nausea.      CURRENT ISSUES    Nursing staff tell me she is experiencing a rapid decline.  She is having a hard time eating (coughing and possibly aspirating) and also has very poor (worsening) dexterity.  She says she she can no longer lift her hands.  Nursing feels that her pain management is insufficient, and the patient,  "herself, says that \"it's not doing so good.\"  She says, \"every time they move me, it hurts for a little while, but then he goes back to not hurting.\"  She says she has a history of \"sick strokes\" and has neuropathy in her left foot and knee that \"feels like needles.\"  She does have a diagnosis of complex regional pain syndrome type 1 of left lower extremity, and this is probably the cause of the neuropathic pain.    I did speak with her hospice nurse, Mina, about pain management, and noted that she really could benefit from a long-acting narcotic analgesic.  Currently, she is receiving hydromorphone 3 mg every 4 hours scheduled and 3 mg hourly as needed.  We are going to add methadone 2.5 mg twice daily and, for her neuropathic pain, we will try 25 mg of tramadol 3 times daily scheduled.  She does say she has had that before, and it has been effective.  She is also getting pramipexole for RLS/neuropathic pain.  She has apparent allergies to gabapentin and pregabalin.    She also has pressure wounds (3 or 4) on her buttocks.  This was not assessed by me today due to the amount of pain she is experiencing.  She is currently resting on an air mattress.    ROS: She has medication for nausea.  No headaches or chest pains, dyspnea, dysuria.    Past Medical History:   Diagnosis Date     Alcohol abuse     HISTORY OF     Anemia      Arthritis     OA, RA     Cardiomyopathy      Cataract     bilateral     Chronic pain 2006    post cva neuralgia     Coronary artery disease      Depression      Disease of thyroid gland     hypothyroid     GERD (gastroesophageal reflux disease)      Incontinence in female     interstim placement     Leg edema      Lumbar pain     spinal cord stimulator implant     Lupus      Mitral valve disorder      Ostium secundum atrial septal defect 1998     PFO (patent foramen ovale) 1998     Sleep apnea     unable to use CPAP     Stroke 1998    hemorrhagic, 1998, 2003.residual imbalance.  hematoma right " basal ganglia and left frontal lobe              Family History   Problem Relation Age of Onset     Rheumatic fever Mother      Heart disease Mother      Cancer Father      COPD Father      Stroke Father      Heart disease Maternal Grandmother      Heart disease Maternal Grandfather      Schizophrenia Daughter      Breast cancer Maternal Aunt      Breast cancer Maternal Aunt      Social History     Social History     Marital status:      Spouse name: N/A     Number of children: N/A     Years of education: N/A     Social History Main Topics     Smoking status: Never Smoker     Smokeless tobacco: Never Used     Alcohol use No     Drug use: No     Sexual activity: No     Other Topics Concern     Not on file     Social History Narrative    ** Merged History Encounter **          MEDICATIONS: Reviewed from the MAR, physician orders, and earlier progress notes.  Updated by me today (10/12/17) with the addition of methadone and tramadol reflected below.  Current Outpatient Prescriptions   Medication Sig     methadone (DOLOPHINE) 5 MG tablet Take 2.5 mg by mouth 2 (two) times a day.     traMADol (ULTRAM) 50 mg tablet Take 25 mg by mouth 3 (three) times a day.     acetaminophen (TYLENOL) 500 MG tablet Take 500-1,000 mg by mouth every 4 (four) hours as needed for pain. For headaches   9/3 17 per assigned order in facility chart     albuterol (PROVENTIL HFA;VENTOLIN HFA) 90 mcg/actuation inhaler Inhale 2 puffs every 4 (four) hours as needed for wheezing.     alum/mag hydrox-simethicone-diphenhydramine-lidocaine (MAGIC MOUTHWASH) suspension Swish and swallow 15 mL 4 (four) times a day as needed.     aluminum-magnesium hydroxide-simethicone (MAALOX ADVANCED) 200-200-20 mg/5 mL Susp Take 30 mL by mouth 3 (three) times a day as needed (dyspepsia, heartburn, flatulence). Indications: Dyspepsia, Heartburn, Flatulence     bisacodyl (DULCOLAX) 10 mg suppository One per rectum daily for constipation     furosemide (LASIX) 20 MG  tablet Take 20 mg by mouth daily. TO Danika Mckeon N.P./Sumi Lagos RN  Indications: Edema     HYDROmorphone (DILAUDID) 10 mg/mL Liqd solution Take 3 mg by mouth every 4 (four) hours. Indications: Pain, SOB     HYDROmorphone (DILAUDID) 10 mg/mL Liqd solution Take 3 mg by mouth every hour as needed (Pain/SOB). Indications: Pain, SOB     levomilnacipran (FETZIMA) 120 mg Cs24 ER capsule Take 1 capsule (120 mg total) by mouth daily.     levothyroxine (SYNTHROID, LEVOTHROID) 75 MCG tablet Take 1 tablet (75 mcg total) by mouth Daily at 6:00 am.     lipase-protease-amylase (CREON) 12,000-38,000 -60,000 unit CpDR capsule Take 1 capsule (12,000 units of lipase total) by mouth 3 (three) times a day with meals.     LORazepam (ATIVAN) 0.5 MG tablet Take 0.5 mg by mouth every 4 (four) hours as needed for anxiety (muscle spasm). Indications: anxiety, Muscle Spasm     methylphenidate HCl (RITALIN) 10 MG tablet Take 1 tablet (10 mg total) by mouth every morning.     METRONIDAZOLE TOP Apply 1 application topically daily as needed (wound care). Indications: wound care     omeprazole (PRILOSEC) 20 MG capsule 1 CAP BY MOUTH TWICE DAILY - TAKE 30 MINUTES BEFORE A MEAL - DO NOT CRUSH (DX: GERD)     ondansetron (ZOFRAN-ODT) 4 MG disintegrating tablet Take 1 tablet (4 mg total) by mouth every 6 (six) hours as needed.     OXYGEN-AIR DELIVERY SYSTEMS [facility] Inhale 2-4 L/min as needed (shortness of breath). per he hospice admission orders  Indications: shortness of breath     polyethylene glycol (MIRALAX) 17 gram packet Take 1 packet (17 g total) by mouth daily. Hold for loose stools     polyvinyl alcohol (ARTIFICIAL TEARS, POLYVIN ALC,) 1.4 % ophthalmic solution Administer 1 drop to both eyes as needed.      pramipexole (MIRAPEX) 0.125 MG tablet Take 0.25 mg by mouth at bedtime. Give at 1900  T.O. Danika Mckeon CNP/Lizet Giang RN  Indications: Restless Legs Syndrome, neuropathic pain     prochlorperazine (COMPAZINE) 10 MG tablet Take  "10 mg by mouth every 6 (six) hours. LAKEISHA Mckeon CNP/Lizet Giang RN  Indications: Nausea and Vomiting     senna-docusate (PERICOLACE) 8.6-50 mg tablet Take 2 tablets by mouth daily.     sodium chloride (OCEAN) 0.65 % nasal spray 1 spray into each nostril as needed for congestion. Install 2 sprays into both nostrils every 4 hours as needed for congestion     topiramate (TOPAMAX) 100 MG tablet 1 TAB BY MOUTH TWICE DAILY (DX:MOOD DISORDER)     traZODone (DESYREL) 50 MG tablet Take 100 mg by mouth at bedtime as needed for sleep. Indications: insomnia associated with depression     ALLERGIES:   Allergies   Allergen Reactions     Iodine Anaphylaxis     Nickel Hives     Pineapple Anaphylaxis     Pineapple Anaphylaxis     Shellfish Containing Products Anaphylaxis     Amlodipine Other (See Comments)     swelling     Beta-Blockers (Beta-Adrenergic Blocking Agts) Other (See Comments)     Blood pressure drops     Gabapentin Other (See Comments)     Blood pressure drops     Latex Hives     Metoprolol Tartrate Unknown     Mushroom Comb No 1      Doesn't like     Nortriptyline Hcl Unknown     Oxycodone-Acetaminophen Nausea And Vomiting     Pregabalin Unknown     Tomato (Solanum Lycopersicum) Hives and Itching     Cooked tomatoes are ok. Raw are not.     Codeine Rash     DIET: Regular.    Vitals:    10/12/17 1122   BP: 104/52   Pulse: 96   Resp: 16   Temp: 97  F (36.1  C)     There is no height or weight on file to calculate BMI. There is some mention of her being 4'7\".    EXAMINATION:   General: Cachectic appearing middle-aged female, lying in bed with a pained expression on her face.  Head: Normocephalic and atraumatic.   Eyes: PERRLA, sclerae clear.   ENT: Moist oral mucosa.   Cardiovascular: Regular rate and rhythm with intermittent ectopy and no appreciable murmur.   Respiratory: Lungs clear to auscultation bilaterally.   Abdomen: Soft and nontender.   Musculoskeletal/Extremities: Cachexia.  Bilateral 3+ pedal " edema, none pretibially.  Integument: Wounds were not reviewed by me today due to the pain it would inflict on the patient.  Cognitive/Psychiatric: Appears to be alert and oriented.  Affect is pained/uncomfortable.    DIAGNOSTICS:   Results for orders placed or performed during the hospital encounter of 08/29/17   Basic Metabolic Panel   Result Value Ref Range    Sodium 144 136 - 145 mmol/L    Potassium 3.4 (L) 3.5 - 5.0 mmol/L    Chloride 114 (H) 98 - 107 mmol/L    CO2 26 22 - 31 mmol/L    Anion Gap, Calculation 4 (L) 5 - 18 mmol/L    Glucose 82 70 - 125 mg/dL    Calcium 7.7 (L) 8.5 - 10.5 mg/dL    BUN 25 8 - 28 mg/dL    Creatinine 0.66 0.60 - 1.10 mg/dL    GFR MDRD Af Amer >60 >60 mL/min/1.73m2    GFR MDRD Non Af Amer >60 >60 mL/min/1.73m2     Lab Results   Component Value Date    WBC 8.1 08/30/2017    HGB 9.3 (L) 08/30/2017    HCT 30.1 (L) 08/30/2017    MCV 95 08/30/2017     08/30/2017     CrCl cannot be calculated (Patient's most recent sCr result is older than the maximum 5 days allowed.).    ASSESSMENT/Plan:      ICD-10-CM    1. Pancreatic adenocarcinoma C25.9    2. Systemic lupus erythematosus M32.9    3. Complex regional pain syndrome type 1 of left lower extremity G90.522    4. Sacral decubitus ulcer, stage II L89.152    5. Severe protein-calorie malnutrition E43    6. Anxiety and depression F41.8    7. Pain management R52      CHANGES:    We are adding methadone 2.5 mg p.o. twice daily for long-acting pain management.  We are also adding tramadol 25 mg p.o. 3 times daily for neuropathic pain.    CARE PLAN:    The care plan has been reviewed and all orders signed. Changes to care plan, if any, as noted. Otherwise, continue care plan of care.  Time spent with this patient was approximately 40 minutes, with greater than 50% spent in counseling and coordination of care that included assessing her pain and discussing with hospice the best way to manage it.    The above has been created using voice  recognition software. Please be aware that this may unintentionally  produce inaccuracies and/or nonsensical sentences.      Electronically signed by: Thai South CNP

## 2021-06-13 NOTE — PROGRESS NOTES
[facility]      Facility:    [facility] [[id number]]  Code Status: DNR/DNI       Chief Complaint/Reason for Visit:  Chief Complaint   Patient presents with     H & P     New admit to LTC on Hospice. (H & P 9/22/17).       HPI:   Michelle is a 72 y.o. female who is seen as new admission in LTC at [facility]. She has hx of pancreatic cancer, was at home but not doing well. She was hospitalized from 8/29/17-9/3/17 and then admitted here. Her discharge summary is excerpted below:    Hospital Summary:   Michelle Moya is a 72-year-old woman with a history of metastatic adenocarcinoma of the pancreas, and chronic pain issues.  She was brought to the hospital for evaluation of acute encephalopathy.  Please see the admission note for details of the history of present illness, past medical history, and physical exam at the time of admission.     She had a CT scan of the head done as part of her emergency room evaluation.  There was no evidence for hemorrhage or acute abnormalities.  A CT of the abdomen and pelvis without contrast was attempted.  There was no evidence for bowel obstruction.  The study was limited due to artifact and lack of contrast.  Lab studies were notable for anemia with a hemoglobin of 9.3.  Potassium was slightly low at 3.4.  BUN was 25 with creatinine of 0.66.  The albumin and total protein were very low consistent with severe protein calorie malnutrition with an albumin of 1.6 and total protein of 4.5.  The magnesium also was low at 1.4.  She initially was started on broad spectrum antibiotic coverage with Zosyn and vancomycin.  Blood and urine cultures remain negative.  There was no evidence for pneumonitis by chest x-ray or exam.  Antibiotics then were discontinued.  Management options were discussed with the patient, family, and Dr. Lerma.  A change to hospice type care was felt appropriate.  A palliative care consult was arranged.  She will be  discharged to a skilled nursing facility with hospice care.  She had been on an intrathecal pump for pain prior to admission.  She was seen by Danita Swain of the [facility] pain service.  The dose of the pump was diminished dramatically.  Prior to discharge, she was changed to Dilaudid as needed for pain due to poor tolerance of oral morphine.  She also will have lorazepam ordered as needed for anxiousness with Zofran for nausea.       Many of her chronic home medications were discontinued prior to transfer.  She will continue on levothyroxine.  She also has been on a medical regimen for treatment of anxiety and depression.  These medications will be continued for now.  However, her Ritalin dose was decreased.    Today:  She is feeling weak but remarks her appetite is better than previous. Has colostomy, functioning fine. No shortness of breath at rest. Able to get out of bed at times. Has pressure ulcer on coccyx. No chest pain. Has overall pain, managed by Hospice. No new vision or hearing problems. No cough, fever or congestion.       Past Medical History:  Past Medical History:   Diagnosis Date     Alcohol abuse     HISTORY OF     Anemia      Arthritis     OA, RA     Cardiomyopathy      Cataract     bilateral     Chronic pain 2006    post cva neuralgia     Coronary artery disease      Depression      Disease of thyroid gland     hypothyroid     GERD (gastroesophageal reflux disease)      Incontinence in female     interstim placement     Leg edema      Lumbar pain     spinal cord stimulator implant     Lupus      Mitral valve disorder      Ostium secundum atrial septal defect 1998     PFO (patent foramen ovale) 1998     Sleep apnea     unable to use CPAP     Stroke 1998    hemorrhagic, 1998, 2003.residual imbalance.  hematoma right basal ganglia and left frontal lobe           Surgical History:  Past Surgical History:   Procedure Laterality Date     ABDOMINAL SURGERY       ANKLE SURGERY Left       APPENDECTOMY       CHOLECYSTECTOMY       COLON SURGERY       COLOSTOMY      FOR CHRONIC FECAL INCONTINENCE     EYE SURGERY       GASTRIC BYPASS      REVERSAL     GLAUCOMA SURGERY      laser     HERNIA REPAIR  2015    obstruction, colostomy     HYSTERECTOMY  1977     JOINT REPLACEMENT Left     ERASMO     JOINT REPLACEMENT Left     TKA     ORIF DISTAL RADIUS FRACTURE Right 10/20/2015     MO ARTHRODESIS ANT INTERBODY MIN DISCECTOMY,LUMBAR  2013    Description: Lumbar Vertebral Fusion;  Recorded: 08/11/2011;     MO EDG US EXAM SURGICAL ALTER STOM DUODENUM/JEJUNUM N/A 7/21/2017    Procedure: Endoscopic Ultrasound, Fine Needle Aspiration, Pancreatic Head Biopsy;  Surgeon: Lewis Simon MD;  Location: [facility];  Service: Gastroenterology     MO REVISE MEDIAN N/CARPAL TUNNEL SURG Right 10/20/2015    Procedure: RIGHT CARPAL TUNNEL RELEASE;  Surgeon: Luis A Perea MD;  [facility]: [facility];  Service: Hand       Family History:   Family History   Problem Relation Age of Onset     Rheumatic fever Mother      Heart disease Mother      Cancer Father      COPD Father      Stroke Father      Heart disease Maternal Grandmother      Heart disease Maternal Grandfather      Schizophrenia Daughter      Breast cancer Maternal Aunt      Breast cancer Maternal Aunt        Social History:    Social History     Social History     Marital status:      Spouse name: N/A     Number of children: N/A     Years of education: N/A     Social History Main Topics     Smoking status: Never Smoker     Smokeless tobacco: Never Used     Alcohol use No     Drug use: No     Sexual activity: No     Other Topics Concern     Not on file     Social History Narrative    ** Merged History Encounter **            Medications:  Current Outpatient Prescriptions   Medication Sig Note     acetaminophen (TYLENOL) 500 MG tablet Take 500-1,000 mg by mouth every 4 (four) hours as needed for pain. For headaches   9/3 17 per assigned order  in facility chart 8/29/2017: 8/29/17: please specify desired frequency. HHN states that patient takes couple time a day.      albuterol (PROVENTIL HFA;VENTOLIN HFA) 90 mcg/actuation inhaler Inhale 2 puffs every 4 (four) hours as needed for wheezing.      alum/mag hydrox-simethicone-diphenhydramine-lidocaine (MAGIC MOUTHWASH) suspension Swish and swallow 15 mL 4 (four) times a day as needed.      aluminum-magnesium hydroxide-simethicone (MAALOX ADVANCED) 200-200-20 mg/5 mL Susp Take 30 mL by mouth 3 (three) times a day as needed (dyspepsia, heartburn, flatulence). Indications: Dyspepsia, Heartburn, Flatulence      bisacodyl (DULCOLAX) 10 mg suppository One per rectum daily for constipation      furosemide (LASIX) 20 MG tablet Take 20 mg by mouth daily. TO Danika Mckeon N.P./Sumi Lagos RN  Indications: Edema      HYDROmorphone (DILAUDID) 2 MG tablet Take 2 mg by mouth every 4 (four) hours. Changed to sol tab 9/9/17  TO Danika Mckeon N.PShaheed/Sumi Lagos RN  Indications: Pain      HYDROmorphone (DILAUDID) 2 MG tablet Take 2 mg by mouth every hour as needed for pain. Changed to sol tab 9/9/17   TO Danika Mckeon NP/Sumi Lagos RN  Indications: Pain      levomilnacipran (FETZIMA) 120 mg Cs24 ER capsule Take 1 capsule (120 mg total) by mouth daily.      levothyroxine (SYNTHROID, LEVOTHROID) 75 MCG tablet Take 1 tablet (75 mcg total) by mouth Daily at 6:00 am.      lipase-protease-amylase (CREON) 12,000-38,000 -60,000 unit CpDR capsule Take 1 capsule (12,000 units of lipase total) by mouth 3 (three) times a day with meals.      LORazepam (ATIVAN) 0.5 MG tablet Take 0.5 mg by mouth every 4 (four) hours as needed for anxiety (muscle spasm). Indications: anxiety, Muscle Spasm      methylphenidate HCl (RITALIN) 10 MG tablet Take 1 tablet (10 mg total) by mouth every morning.      METRONIDAZOLE TOP Apply 1 application topically daily as needed (wound care). Indications: wound care      omeprazole (PRILOSEC) 20 MG capsule 1 CAP  BY MOUTH TWICE DAILY - TAKE 30 MINUTES BEFORE A MEAL - DO NOT CRUSH (DX: GERD)      ondansetron (ZOFRAN-ODT) 4 MG disintegrating tablet Take 1 tablet (4 mg total) by mouth every 6 (six) hours as needed.      OXYGEN-AIR DELIVERY SYSTEMS MISC Inhale 2-4 L/min as needed (shortness of breath). per he hospice admission orders  Indications: shortness of breath      polyethylene glycol (MIRALAX) 17 gram packet Take 1 packet (17 g total) by mouth daily. Hold for loose stools      polyvinyl alcohol (ARTIFICIAL TEARS, POLYVIN ALC,) 1.4 % ophthalmic solution Administer 1 drop to both eyes as needed.       pramipexole (MIRAPEX) 0.125 MG tablet Take 0.25 mg by mouth at bedtime. Give at 1900  WESTLEY.KEN Mckeon CNP/Lizet Giang, RN  Indications: Restless Legs Syndrome, neuropathic pain      prochlorperazine (COMPAZINE) 10 MG tablet Take 10 mg by mouth every 6 (six) hours. LAKEISHA Mckeon CNP/Lizet Giang RN  Indications: Nausea and Vomiting      senna-docusate (PERICOLACE) 8.6-50 mg tablet Take 2 tablets by mouth daily.      sodium chloride (OCEAN) 0.65 % nasal spray 1 spray into each nostril as needed for congestion. Install 2 sprays into both nostrils every 4 hours as needed for congestion      topiramate (TOPAMAX) 100 MG tablet 1 TAB BY MOUTH TWICE DAILY (DX:MOOD DISORDER)      traZODone (DESYREL) 50 MG tablet Take 100 mg by mouth at bedtime as needed for sleep. Indications: insomnia associated with depression        Allergies:  Allergies   Allergen Reactions     Iodine Anaphylaxis     Nickel Hives     Pineapple Anaphylaxis     Pineapple Anaphylaxis     Shellfish Containing Products Anaphylaxis     Amlodipine Other (See Comments)     swelling     Beta-Blockers (Beta-Adrenergic Blocking Agts) Other (See Comments)     Blood pressure drops     Gabapentin Other (See Comments)     Blood pressure drops     Latex Hives     Metoprolol Tartrate Unknown     Mushroom Comb No 1      Doesn't like     Nortriptyline Hcl Unknown      Oxycodone-Acetaminophen Nausea And Vomiting     Pregabalin Unknown     Tomato (Solanum Lycopersicum) Hives and Itching     Cooked tomatoes are ok. Raw are not.     Codeine Rash       Review of Systems:  Pertinent items are noted in HPI.      Physical Exam:   General: Patient is alert, very thin and cachetic.  Vitals: /48, Temp 97, Pulse 96, RR 16, O2 sat 97% RA.  HEENT: Head is NCAT. Eyes show no injection or icterus. Nares negative. Oropharynx well hydrated.  Neck: Supple. No tenderness or adenopathy. No JVD.  Lungs: Clear bilaterally. No wheezes.  Cardiovascular: Regular rate and rhythm, normal S1, S2.  Back: No spinal or CVA tenderness.  Abdomen: Soft, no tenderness on exam. Bowel sounds present. No guarding rebound or rigidity. Has colostomy.  : Deferred.  Extremities: No edema is noted.  Musculoskeletal: Mild degen changes.  Skin: Open areas coccyx.   Psych: Mood appears good.      Assessment/Plan:  1. Pancreatic cancer. On Hospice. Efforts at comfort and symptom control.    2. ADHD. On Ritalin.  3. RLS. Cont meds.  4. Hypothyroidism. On replacement.  5. Hx RA and lupus. Meds stopped per hospital direction.  6. Weakness. Overall decline.  7. Code status is DNR/DNI/Hospice.      Total time greater than 50 minutes, greater than 50% counseling and coordination of care, time spent in interview and examination of patient, review of records, discussion with nursing staff.        Electronically signed by: Renata Mckeon MD

## 2021-07-03 NOTE — ADDENDUM NOTE
Addendum Note by Carlton Lerma DO at 6/19/2017 10:06 AM     Author: Carlton Lerma DO Service: -- Author Type: Physician    Filed: 6/19/2017 10:06 AM Encounter Date: 6/14/2017 Status: Signed    : Carlton Lerma DO (Physician)    Addended by: CARLTON LERMA on: 6/19/2017 10:06 AM        Modules accepted: Orders

## 2021-07-03 NOTE — ADDENDUM NOTE
Addendum Note by Philly Mckinnon RN at 5/11/2017  4:32 PM     Author: Philly Mckinnon RN Service: -- Author Type: Registered Nurse    Filed: 5/11/2017  4:32 PM Encounter Date: 5/9/2017 Status: Signed    : Philly Mckinnon RN (Registered Nurse)    Addended by: PHILLY MCKINNON on: 5/11/2017 04:32 PM        Modules accepted: Orders